# Patient Record
Sex: MALE | Race: BLACK OR AFRICAN AMERICAN | Employment: FULL TIME | URBAN - METROPOLITAN AREA
[De-identification: names, ages, dates, MRNs, and addresses within clinical notes are randomized per-mention and may not be internally consistent; named-entity substitution may affect disease eponyms.]

---

## 2017-04-09 ENCOUNTER — HOSPITAL ENCOUNTER (EMERGENCY)
Age: 22
Discharge: HOME | End: 2017-04-09
Attending: EMERGENCY MEDICINE | Admitting: EMERGENCY MEDICINE
Payer: SELF-PAY

## 2017-04-09 ENCOUNTER — APPOINTMENT (OUTPATIENT)
Dept: GENERAL RADIOLOGY | Age: 22
End: 2017-04-09
Attending: NURSE PRACTITIONER
Payer: SELF-PAY

## 2017-04-09 VITALS
OXYGEN SATURATION: 99 % | TEMPERATURE: 98.5 F | RESPIRATION RATE: 16 BRPM | HEIGHT: 68 IN | WEIGHT: 220 LBS | SYSTOLIC BLOOD PRESSURE: 160 MMHG | HEART RATE: 96 BPM | DIASTOLIC BLOOD PRESSURE: 108 MMHG | BODY MASS INDEX: 33.34 KG/M2
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
## 2017-04-09 DIAGNOSIS — S60.811A: ICD-10-CM

## 2017-04-09 DIAGNOSIS — S43.101A AC SEPARATION, RIGHT, INITIAL ENCOUNTER: ICD-10-CM

## 2017-04-09 DIAGNOSIS — I10 HYPERTENSION WITH GOAL TO BE DETERMINED: ICD-10-CM

## 2017-04-09 PROCEDURE — 99283 EMERGENCY DEPT VISIT LOW MDM: CPT

## 2017-04-09 PROCEDURE — 90471 IMMUNIZATION ADMIN: CPT

## 2017-04-09 PROCEDURE — 90715 TDAP VACCINE 7 YRS/> IM: CPT | Performed by: NURSE PRACTITIONER

## 2017-04-09 PROCEDURE — 74011250636 HC RX REV CODE- 250/636: Performed by: NURSE PRACTITIONER

## 2017-04-09 PROCEDURE — 74011250637 HC RX REV CODE- 250/637: Performed by: NURSE PRACTITIONER

## 2017-04-09 PROCEDURE — L3670 SO ACRO/CLAV CAN WEB PRE OTS: HCPCS

## 2017-04-09 PROCEDURE — 73030 X-RAY EXAM OF SHOULDER: CPT

## 2017-04-09 RX ORDER — OXYCODONE AND ACETAMINOPHEN 10; 325 MG/1; MG/1
1 TABLET ORAL
Status: COMPLETED | OUTPATIENT
Start: 2017-04-09 | End: 2017-04-09

## 2017-04-09 RX ORDER — OXYCODONE AND ACETAMINOPHEN 5; 325 MG/1; MG/1
1 TABLET ORAL
Qty: 10 TAB | Refills: 0 | Status: SHIPPED | OUTPATIENT
Start: 2017-04-09 | End: 2017-04-17

## 2017-04-09 RX ADMIN — TETANUS TOXOID, REDUCED DIPHTHERIA TOXOID AND ACELLULAR PERTUSSIS VACCINE, ADSORBED 0.5 ML: 5; 2.5; 8; 8; 2.5 SUSPENSION INTRAMUSCULAR at 16:33

## 2017-04-09 RX ADMIN — OXYCODONE HYDROCHLORIDE AND ACETAMINOPHEN 1 TABLET: 10; 325 TABLET ORAL at 16:33
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
## 2017-04-09 NOTE — DISCHARGE INSTRUCTIONS
Scrapes (Abrasions): Care Instructions  Your Care Instructions  Scrapes (abrasions) are wounds where your skin has been rubbed or torn off. Most scrapes do not go deep into the skin, but some may remove several layers of skin. Scrapes usually don't bleed much, but they may ooze pinkish fluid. Scrapes on the head or face may appear worse than they are. They may bleed a lot because of the good blood supply to this area. Most scrapes heal well and may not need a bandage. They usually heal within 3 to 7 days. A large, deep scrape may take 1 to 2 weeks or longer to heal. A scab may form on some scrapes. Follow-up care is a key part of your treatment and safety. Be sure to make and go to all appointments, and call your doctor if you are having problems. It's also a good idea to know your test results and keep a list of the medicines you take. How can you care for yourself at home? · If your doctor told you how to care for your wound, follow your doctor's instructions. If you did not get instructions, follow this general advice:  ¨ Wash the scrape with clean water 2 times a day. Don't use hydrogen peroxide or alcohol, which can slow healing. ¨ You may cover the scrape with a thin layer of petroleum jelly, such as Vaseline, and a nonstick bandage. ¨ Apply more petroleum jelly and replace the bandage as needed. · Prop up the injured area on a pillow anytime you sit or lie down during the next 3 days. Try to keep it above the level of your heart. This will help reduce swelling. · Be safe with medicines. Take pain medicines exactly as directed. ¨ If the doctor gave you a prescription medicine for pain, take it as prescribed. ¨ If you are not taking a prescription pain medicine, ask your doctor if you can take an over-the-counter medicine. When should you call for help?   Call your doctor now or seek immediate medical care if:  · You have signs of infection, such as:  ¨ Increased pain, swelling, warmth, or redness around the scrape. ¨ Red streaks leading from the scrape. ¨ Pus draining from the scrape. ¨ A fever. · The scrape starts to bleed, and blood soaks through the bandage. Oozing small amounts of blood is normal.  Watch closely for changes in your health, and be sure to contact your doctor if the scrape is not getting better each day. Where can you learn more? Go to http://shima-espinoza.info/. Enter A374 in the search box to learn more about \"Scrapes (Abrasions): Care Instructions. \"  Current as of: May 27, 2016  Content Version: 11.2  © 5724-4025 [employer]. Care instructions adapted under license by [employer] (which disclaims liability or warranty for this information). If you have questions about a medical condition or this instruction, always ask your healthcare professional. [address] [address] any warranty or liability for your use of this information. High Blood Pressure: Care Instructions  Your Care Instructions  If your blood pressure is usually above 140/90, you have high blood pressure, or hypertension. That means the top number is 140 or higher or the bottom number is 90 or higher, or both. Despite what a lot of people think, high blood pressure usually doesn't cause headaches or make you feel dizzy or lightheaded. It usually has no symptoms. But it does increase your risk for heart attack, stroke, and kidney or eye damage. The higher your blood pressure, the more your risk increases. Your doctor will give you a goal for your blood pressure. Your goal will be based on your health and your age. An example of a goal is to keep your blood pressure below 140/90. Lifestyle changes, such as eating healthy and being active, are always important to help lower blood pressure. You might also take medicine to reach your blood pressure goal.  Follow-up care is a key part of your treatment and safety.  Be sure to make and go to all appointments, and call your doctor if you are having problems. It's also a good idea to know your test results and keep a list of the medicines you take. How can you care for yourself at home? Medical treatment  · If you stop taking your medicine, your blood pressure will go back up. You may take one or more types of medicine to lower your blood pressure. Be safe with medicines. Take your medicine exactly as prescribed. Call your doctor if you think you are having a problem with your medicine. · Talk to your doctor before you start taking aspirin every day. Aspirin can help certain people lower their risk of a heart attack or stroke. But taking aspirin isn't right for everyone, because it can cause serious bleeding. · See your doctor regularly. You may need to see the doctor more often at first or until your blood pressure comes down. · If you are taking blood pressure medicine, talk to your doctor before you take decongestants or anti-inflammatory medicine, such as ibuprofen. Some of these medicines can raise blood pressure. · Learn how to check your blood pressure at home. Lifestyle changes  · Stay at a healthy weight. This is especially important if you put on weight around the waist. Losing even 10 pounds can help you lower your blood pressure. · If your doctor recommends it, get more exercise. Walking is a good choice. Bit by bit, increase the amount you walk every day. Try for at least 30 minutes on most days of the week. You also may want to swim, bike, or do other activities. · Avoid or limit alcohol. Talk to your doctor about whether you can drink any alcohol. · Try to limit how much sodium you eat to less than 2,300 milligrams (mg) a day. Your doctor may ask you to try to eat less than 1,500 mg a day. · Eat plenty of fruits (such as bananas and oranges), vegetables, legumes, whole grains, and low-fat dairy products. · Lower the amount of saturated fat in your diet.  Saturated fat is found in animal products such as milk, cheese, and meat. Limiting these foods may help you lose weight and also lower your risk for heart disease. · Do not smoke. Smoking increases your risk for heart attack and stroke. If you need help quitting, talk to your doctor about stop-smoking programs and medicines. These can increase your chances of quitting for good. When should you call for help? Call 911 anytime you think you may need emergency care. This may mean having symptoms that suggest that your blood pressure is causing a serious heart or blood vessel problem. Your blood pressure may be over 180/110. For example, call 911 if:  · You have symptoms of a heart attack. These may include:  ¨ Chest pain or pressure, or a strange feeling in the chest.  ¨ Sweating. ¨ Shortness of breath. ¨ Nausea or vomiting. ¨ Pain, pressure, or a strange feeling in the back, neck, jaw, or upper belly or in one or both shoulders or arms. ¨ Lightheadedness or sudden weakness. ¨ A fast or irregular heartbeat. · You have symptoms of a stroke. These may include:  ¨ Sudden numbness, tingling, weakness, or loss of movement in your face, arm, or leg, especially on only one side of your body. ¨ Sudden vision changes. ¨ Sudden trouble speaking. ¨ Sudden confusion or trouble understanding simple statements. ¨ Sudden problems with walking or balance. ¨ A sudden, severe headache that is different from past headaches. · You have severe back or belly pain. Do not wait until your blood pressure comes down on its own. Get help right away. Call your doctor now or seek immediate care if:  · Your blood pressure is much higher than normal (such as 180/110 or higher), but you don't have symptoms. · You think high blood pressure is causing symptoms, such as:  ¨ Severe headache. ¨ Blurry vision. Watch closely for changes in your health, and be sure to contact your doctor if:  · Your blood pressure measures 140/90 or higher at least 2 times.  That means the top number is 140 or higher or the bottom number is 90 or higher, or both. · You think you may be having side effects from your blood pressure medicine. · Your blood pressure is usually normal, but it goes above normal at least 2 times. Where can you learn more? Go to http://shima-espinoza.info/. Enter O340 in the search box to learn more about \"High Blood Pressure: Care Instructions. \"  Current as of: August 8, 2016  Content Version: 11.2  © 6921-9839 [employer]. Care instructions adapted under license by [employer] (which disclaims liability or warranty for this information). If you have questions about a medical condition or this instruction, always ask your healthcare professional. [address] any warranty or liability for your use of this information. Motor Vehicle Accident: Care Instructions  Your Care Instructions  You were seen by a doctor after a motor vehicle accident. Because of the accident, you may be sore for several days. Over the next few days, you may hurt more than you did just after the accident. The doctor has checked you carefully, but problems can develop later. If you notice any problems or new symptoms, get medical treatment right away. Follow-up care is a key part of your treatment and safety. Be sure to make and go to all appointments, and call your doctor if you are having problems. It's also a good idea to know your test results and keep a list of the medicines you take. How can you care for yourself at home? · Keep track of any new symptoms or changes in your symptoms. · Take it easy for the next few days, or longer if you are not feeling well. Do not try to do too much. · Put ice or a cold pack on any sore areas for 10 to 20 minutes at a time to stop swelling. Put a thin cloth between the ice pack and your skin. Do this several times a day for the first 2 days. · Be safe with medicines.  Take pain medicines exactly as directed. ¨ If the doctor gave you a prescription medicine for pain, take it as prescribed. ¨ If you are not taking a prescription pain medicine, ask your doctor if you can take an over-the-counter medicine. · Do not drive after taking a prescription pain medicine. · Do not do anything that makes the pain worse. · Do not drink any alcohol for 24 hours or until your doctor tells you it is okay. When should you call for help? Call 911 if:  · You passed out (lost consciousness). Call your doctor now or seek immediate medical care if:  · You have new or worse belly pain. · You have new or worse trouble breathing. · You have new or worse head pain. · You have new pain, or your pain gets worse. · You have new symptoms, such as numbness or vomiting. Watch closely for changes in your health, and be sure to contact your doctor if:  · You are not getting better as expected. Where can you learn more? Go to http://shima-espinoza.info/. Enter N000 in the search box to learn more about \"Motor Vehicle Accident: Care Instructions. \"  Current as of: May 27, 2016  Content Version: 11.2  © 5243-5821 [employer]. Care instructions adapted under license by [employer] (which disclaims liability or warranty for this information). If you have questions about a medical condition or this instruction, always ask your healthcare professional. [place] [address] any warranty or liability for your use of this information. Shoulder Separation: Care Instructions  Your Care Instructions    A shoulder separation is a tearing of the ligaments that connect two bones of the shoulder--the collarbone (clavicle) and the end of the shoulder blade (acromion). The ligaments can be partially or completely torn. This is usually caused by a blow to the top of the shoulder or a fall onto an outstretched arm.   Shoulder injuries can be slow to heal, but with time and effort, your shoulder should get better. Physical therapy can help you regain strength, motion, and flexibility in your shoulder. Follow-up care is a key part of your treatment and safety. Be sure to make and go to all appointments, and call your doctor if you are having problems. It's also a good idea to know your test results and keep a list of the medicines you take. How can you care for yourself at home? · If your doctor put your arm in a sling, wear the sling as directed. Do not take it off before your doctor tells you to. · Take pain medicines exactly as directed. ¨ If the doctor gave you a prescription medicine for pain, take it as prescribed. ¨ If you are not taking a prescription pain medicine, ask your doctor if you can take an over-the-counter medicine. · Rest your shoulder as much as you can. · Put ice or a cold pack on your shoulder for 10 to 20 minutes at a time. Try to do this every 1 to 2 hours for the next 3 days (when you are awake) or until the swelling goes down. Put a thin cloth between the ice and your skin. · You may use warm packs after the first 3 days for 15 to 20 minutes at a time to ease pain. · If your doctor gave you exercises to do at home, do them exactly as instructed. · Do not do anything that makes pain worse. · Go to all follow-up appointments. You and your doctor will decide if you need further treatment, including surgery. You and your doctor will also decide when to begin physical therapy, if it is needed. When should you call for help? Call your doctor now or seek immediate medical care if:  · Your pain gets a lot worse. · You cannot move your arm. · You have new weakness, numbness, or tingling in your hand or arm. · Your arm or hand is cool or pale or changes color. · Your sling feels too tight, and you cannot loosen it.   Watch closely for changes in your health, and be sure to contact your doctor if:  · You have new or increased swelling in your arm.  · You have new pain that develops in another area of your arm. For example, you have pain in your hand or elbow. · You do not get better as expected. Where can you learn more? Go to http://shima-espinoza.info/. Enter S051 in the search box to learn more about \"Shoulder Separation: Care Instructions. \"  Current as of: May 23, 2016  Content Version: 11.2  © 4096-1573 [employer]. Care instructions adapted under license by [employer] (which disclaims liability or warranty for this information). If you have questions about a medical condition or this instruction, always ask your healthcare professional. [address] any warranty or liability for your use of this information. bookjam Activation    Thank you for requesting access to bookjam. Please follow the instructions below to securely access and download your online medical record. bookjam allows you to send messages to your doctor, view your test results, renew your prescriptions, schedule appointments, and more. How Do I Sign Up? 1. In your internet browser, go to [email or web address]  2. Click on the First Time User? Click Here link in the Sign In box. You will be redirect to the New Member Sign Up page. 3. Enter your bookjam Access Code exactly as it appears below. You will not need to use this code after youve completed the sign-up process. If you do not sign up before the expiration date, you must request a new code. bookjam Access Code: [email or web address]  Expires: 2017  4:28 PM (This is the date your bookjam access code will )    4. Enter the last four digits of your Social Security Number (xxxx) and Date of Birth (mm/dd/yyyy) as indicated and click Submit. You will be taken to the next sign-up page. 5. Create a bookjam ID. This will be your bookjam login ID and cannot be changed, so think of one that is secure and easy to remember. 6. Create a bookjam password. You can change your password at any time. 7. Enter your Password Reset Question and Answer. This can be used at a later time if you forget your password. 8. Enter your e-mail address. You will receive e-mail notification when new information is available in [address] 9. Click Sign Up. You can now view and download portions of your medical record. 10. Click the Download Summary menu link to download a portable copy of your medical information. Additional Information    If you have questions, please visit the Frequently Asked Questions section of the 1[employer] website at [email or web address] Remember, 1SDK is NOT to be used for urgent needs. For medical emergencies, dial 911.

## 2017-04-11 ENCOUNTER — OFFICE VISIT (OUTPATIENT)
Dept: ORTHOPEDIC SURGERY | Age: 22
End: 2017-04-11

## 2017-04-11 VITALS
DIASTOLIC BLOOD PRESSURE: 99 MMHG | HEART RATE: 76 BPM | SYSTOLIC BLOOD PRESSURE: 147 MMHG | TEMPERATURE: 99.1 F | BODY MASS INDEX: 34.06 KG/M2 | WEIGHT: 224 LBS

## 2017-04-11 DIAGNOSIS — S43.101A ACROMIOCLAVICULAR JOINT SEPARATION, RIGHT, INITIAL ENCOUNTER: Primary | ICD-10-CM

## 2017-04-11 RX ORDER — OXYCODONE AND ACETAMINOPHEN 7.5; 325 MG/1; MG/1
1-2 TABLET ORAL
Qty: 60 TAB | Refills: 0 | Status: SHIPPED | OUTPATIENT
Start: 2017-04-11 | End: 2017-07-11

## 2017-04-11 RX ORDER — OXYCODONE AND ACETAMINOPHEN 5; 325 MG/1; MG/1
1 TABLET ORAL
Qty: 30 TAB | Refills: 0 | Status: SHIPPED | OUTPATIENT
Start: 2017-04-11 | End: 2017-07-11
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
## 2017-04-11 NOTE — LETTER
Patient: Cristofer Gage Surgery Date: 4/19/17  Time: 9:30AM 
 
PROCEDURE: RIGHT AC JOINT RECONSRUCTION Report for your Surgery at 8:00AM  @ [address] [address] [place]: 
  
o Five (5) days prior to surgery STOP taking ASPIRIN, ANTI-INFLAMMATORY , and/or BLOOD THINNER MEDICATIONS (such as COUMADIN, PLAVIX, HEPARIN or others). o If you are taking blood thinner medication please contact your prescribing physician for any special instructions. LAB: Report to  [facility]  or [facility]  14 days before your surgery date. (Your orders for these tests are in [address] [place] [address] at the [address] may not eat or drink anything after midnight the night before  
o It doesn't matter if you have eaten before going to the [facility] [address] 
o If required labs and EKG are not completed Surgery will be canceled. THE DAY OF SURGERY: 
  
 
o Do not eat, chew gum or drink anything after Midnight prior to the date of your surgery 
o Take your regular medications with small sips of water unless otherwise instructed. (This means blood pressure and/or heart medicine) If you are insulin dependent, bring your insulin with you, unless otherwise instructed. 
o Bring a list of your medications and the dosage to the hospital. 
o Do not wear nail polish, make-up or jewelry. o Do not bring valuables or money to the hospital. 
o Have someone accompany you so they may drive you home following the surgery. Bring your drivers license, your insurance card and Hospital co-pay. Return for your postoperative visit on Tuesday, April 25, 2017 01:20 PM at the [facility] SURG[facility] [id number] [place], [address] This appointment will be with  Dr. Cathi Hill. Nancy Valerio  [phone]      Surgical Coordinator
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
## 2017-04-11 NOTE — LETTER
[address] [facility] Surgery [facility] Fax to [phone]                                        Telephone: [phone] or [phone] To be completed by Physician Office: 
 
Date: 2017    Requested by: Leslie Smith Phone No: [phone]  Fax No:  [phone] Surgery Date: 17 Requested Time: 9:30AM         
      
Surgeon: Trudy Cardoza MD  Assistant/2nd Surgeon: Please [place]:            EMERGENT       URGENT         ELECTIVE  
CPT CODE*  Procedure 49585 RIGHT SHOULDER AC JOINT RECONSTRUCTION  
   
 *** *CPT code is required for ALL procedures. Patient Information: 
 
Name: Uri Hendrix SSN: [id number]  : 1995   Male/Female: male Home Phone No: [phone] (home) Primary Insurance: SELF PAY   Insurance Policy Number:   
 
*If self-pay, please fax Self-Pay/Anna Marie Verification Form with this request. Elective cases will not be scheduled until approved. Latex Allergy:  No  
Allergies: No Known Allergies ICD10 code(s): S43.101A  Diagnosis: AC JOINT SEPARATION Admission:  Outpatient Anesthesia Type General 
 
Comments/Special Equipment and/or  ARTHREX, DOG BONE, NO GRAFT

## 2017-04-12 ENCOUNTER — DOCUMENTATION ONLY (OUTPATIENT)
Dept: ORTHOPEDIC SURGERY | Age: 22
End: 2017-04-12

## 2017-04-18 ENCOUNTER — TELEPHONE (OUTPATIENT)
Dept: ORTHOPEDIC SURGERY | Age: 22
End: 2017-04-18

## 2017-04-18 ENCOUNTER — ANESTHESIA EVENT (OUTPATIENT)
Dept: SURGERY | Age: 22
End: 2017-04-18
Payer: SELF-PAY

## 2017-04-18 NOTE — DISCHARGE INSTRUCTIONS
Dr. Nuzhat Haro Shoulder Surgery  Postoperative Information    You will be given a prescription for pain medication. It may be taken every 4-6 hours as needed for the first few days after surgery. You should place an ice bag over your shoulder to help with pain and reduce swelling. A soft bandage was placed on your shoulder. You may take the bandage off two days after surgery and clean the incision sites with peroxide. Band-aids should be placed over each incision site for at least four days. There are 2 or 3 small incisions in your shoulder and they may be sore and develop bruising over the next several days. The bruising should resolve and no special care will be needed. It is safe to take a shower or bathe two days after surgery. Just keep your incision covered. You will be given a sling to use. Continue to wear this at all times, unless you are given different instructions. No moving the arm away from your body on your own, reaching or carrying with the operated arm. You will be shown specific exercises when you see your physical therapist in a week. Even though your incisions are small, there has been an operation inside and around the shoulder joint. Complete healing may take weeks or several months. If you have a high temperature, unexpected pain, redness or swelling in your shoulder please contact my office immediately. Please call to make an appointment to see me in my office in 1 week.      Dr. Nuzhat Haro office number [place] from Nurse    The following personal items are in your possession at time of discharge:    Dental Appliances: None  Visual Aid: None     Home Medications: None  Jewelry: None  Clothing: Pants, Shirt, Footwear, Socks, Undergarments  Other Valuables: None     PATIENT INSTRUCTIONS:    After general anesthesia or intravenous sedation, for 24 hours or while taking prescription Narcotics:  · Limit your activities  · Do not drive and operate hazardous machinery  · Do not make important personal or business decisions  · Do  not drink alcoholic beverages  · If you have not urinated within 8 hours after discharge, please contact your surgeon on call. Report the following to your surgeon:  · Excessive pain, swelling, redness or odor of or around the surgical area  · Temperature over 100.5  · Nausea and vomiting lasting longer than 4 hours or if unable to take medications  · Any signs of decreased circulation or nerve impairment to extremity: change in color, persistent  numbness, tingling, coldness or increase pain  · Any questions    *  Please give a list of your current medications to your Primary Care Provider. *  Please update this list whenever your medications are discontinued, doses are      changed, or new medications (including over-the-counter products) are added. *  Please carry medication information at all times in case of emergency situations. These are general instructions for a healthy lifestyle:    No smoking/ No tobacco products/ Avoid exposure to second hand smoke    Surgeon General's Warning:  Quitting smoking now greatly reduces serious risk to your health. Obesity, smoking, and sedentary lifestyle greatly increases your risk for illness    A healthy diet, regular physical exercise & weight monitoring are important for maintaining a healthy lifestyle    You may be retaining fluid if you have a history of heart failure or if you experience any of the following symptoms:  Weight gain of 3 pounds or more overnight or 5 pounds in a week, increased swelling in our hands or feet or shortness of breath while lying flat in bed. Please call your doctor as soon as you notice any of these symptoms; do not wait until your next office visit.     Recognize signs and symptoms of STROKE:    F-face looks uneven    A-arms unable to move or move unevenly    S-speech slurred or non-existent    T-time-call 911 as soon as signs and symptoms begin-DO NOT go       Back to bed or wait to see if you get better-TIME IS BRAIN. Warning Signs of HEART ATTACK     Call 911 if you have these symptoms:   Chest discomfort. Most heart attacks involve discomfort in the center of the chest that lasts more than a few minutes, or that goes away and comes back. It can feel like uncomfortable pressure, squeezing, fullness, or pain.  Discomfort in other areas of the upper body. Symptoms can include pain or discomfort in one or both arms, the back, neck, jaw, or stomach.  Shortness of breath with or without chest discomfort.  Other signs may include breaking out in a cold sweat, nausea, or lightheadedness. Don't wait more than five minutes to call 911 - MINUTES MATTER! Fast action can save your life. Calling 911 is almost always the fastest way to get lifesaving treatment. Emergency Medical Services staff can begin treatment when they arrive -- up to an hour sooner than if someone gets to the hospital by car. The discharge information has been reviewed with the patient and mother. The patient and mother verbalized understanding. Discharge medications reviewed with the patient and mother and appropriate educational materials and side effects teaching were provided.

## 2017-04-18 NOTE — TELEPHONE ENCOUNTER
PT CALLING IN WOULD LIKE TO GET A STATUS ON THE DISABILITY PAPERWORK THAT HE DROPPED OFF ON 4/11/17, PLEASE CALL THE PT AND ADVISE, THANK YOU.

## 2017-04-19 ENCOUNTER — HOSPITAL ENCOUNTER (OUTPATIENT)
Age: 22
Setting detail: OUTPATIENT SURGERY
Discharge: HOME | End: 2017-04-19
Attending: ORTHOPAEDIC SURGERY | Admitting: ORTHOPAEDIC SURGERY
Payer: SELF-PAY

## 2017-04-19 ENCOUNTER — ANESTHESIA (OUTPATIENT)
Dept: SURGERY | Age: 22
End: 2017-04-19
Payer: SELF-PAY

## 2017-04-19 ENCOUNTER — APPOINTMENT (OUTPATIENT)
Dept: GENERAL RADIOLOGY | Age: 22
End: 2017-04-19
Attending: ORTHOPAEDIC SURGERY
Payer: SELF-PAY

## 2017-04-19 VITALS
HEART RATE: 102 BPM | SYSTOLIC BLOOD PRESSURE: 156 MMHG | WEIGHT: 221 LBS | RESPIRATION RATE: 18 BRPM | BODY MASS INDEX: 33.49 KG/M2 | OXYGEN SATURATION: 100 % | DIASTOLIC BLOOD PRESSURE: 89 MMHG | HEIGHT: 68 IN | TEMPERATURE: 96 F

## 2017-04-19 PROCEDURE — 77030016625 HC BOWL MX BN4 ZIMM -A: Performed by: ORTHOPAEDIC SURGERY

## 2017-04-19 PROCEDURE — 77030031139 HC SUT VCRL2 J&J -A: Performed by: ORTHOPAEDIC SURGERY

## 2017-04-19 PROCEDURE — 77030008574 HC TBNG SUC IRR STRY -B: Performed by: ORTHOPAEDIC SURGERY

## 2017-04-19 PROCEDURE — 77030012012 HC AIRWY OP SFT TELE -A: Performed by: NURSE ANESTHETIST, CERTIFIED REGISTERED

## 2017-04-19 PROCEDURE — 76060000036 HC ANESTHESIA 2.5 TO 3 HR: Performed by: ORTHOPAEDIC SURGERY

## 2017-04-19 PROCEDURE — 77030004413 HC BUR OVL STRY -B: Performed by: ORTHOPAEDIC SURGERY

## 2017-04-19 PROCEDURE — 74011000250 HC RX REV CODE- 250

## 2017-04-19 PROCEDURE — 77030019908 HC STETH ESOPH SIMS -A: Performed by: NURSE ANESTHETIST, CERTIFIED REGISTERED

## 2017-04-19 PROCEDURE — 77030010816: Performed by: ORTHOPAEDIC SURGERY

## 2017-04-19 PROCEDURE — 77030008189 HC RTVR SUT ARTH CNMD -B: Performed by: ORTHOPAEDIC SURGERY

## 2017-04-19 PROCEDURE — L3650 SO 8 ABD RESTRAINT PRE OTS: HCPCS | Performed by: ORTHOPAEDIC SURGERY

## 2017-04-19 PROCEDURE — 74011250636 HC RX REV CODE- 250/636: Performed by: NURSE ANESTHETIST, CERTIFIED REGISTERED

## 2017-04-19 PROCEDURE — 77030006835 HC BLD SAW SAG STRY -B: Performed by: ORTHOPAEDIC SURGERY

## 2017-04-19 PROCEDURE — 77030008683 HC TU ET CUF COVD -A: Performed by: NURSE ANESTHETIST, CERTIFIED REGISTERED

## 2017-04-19 PROCEDURE — 76010000172 HC OR TIME 2.5 TO 3 HR INTENSV-TIER 1: Performed by: ORTHOPAEDIC SURGERY

## 2017-04-19 PROCEDURE — 77030002912 HC SUT ETHBND J&J -A: Performed by: ORTHOPAEDIC SURGERY

## 2017-04-19 PROCEDURE — 77030002933 HC SUT MCRYL J&J -A: Performed by: ORTHOPAEDIC SURGERY

## 2017-04-19 PROCEDURE — 76210000021 HC REC RM PH II 0.5 TO 1 HR: Performed by: ORTHOPAEDIC SURGERY

## 2017-04-19 PROCEDURE — 77030006668 HC BLD SHV MENSCS STRY -B: Performed by: ORTHOPAEDIC SURGERY

## 2017-04-19 PROCEDURE — 77030003666 HC NDL SPINAL BD -A: Performed by: ORTHOPAEDIC SURGERY

## 2017-04-19 PROCEDURE — 77030020788: Performed by: ORTHOPAEDIC SURGERY

## 2017-04-19 PROCEDURE — 77030011266 HC ELECTRD BLD INSL COVD -A: Performed by: ORTHOPAEDIC SURGERY

## 2017-04-19 PROCEDURE — 76210000016 HC OR PH I REC 1 TO 1.5 HR: Performed by: ORTHOPAEDIC SURGERY

## 2017-04-19 PROCEDURE — 77030011265 HC ELECTRD BLD HEX COVD -A: Performed by: ORTHOPAEDIC SURGERY

## 2017-04-19 PROCEDURE — 76942 ECHO GUIDE FOR BIOPSY: CPT | Performed by: ANESTHESIOLOGY

## 2017-04-19 PROCEDURE — 74011250636 HC RX REV CODE- 250/636

## 2017-04-19 PROCEDURE — 77030032490 HC SLV COMPR SCD KNE COVD -B: Performed by: ORTHOPAEDIC SURGERY

## 2017-04-19 PROCEDURE — 77030018074 HC RTVR SUT ARTH4 S&N -B: Performed by: ORTHOPAEDIC SURGERY

## 2017-04-19 PROCEDURE — 77030010785: Performed by: ORTHOPAEDIC SURGERY

## 2017-04-19 PROCEDURE — 77030018552 HC SUT FBRTAPE1 ARTH -B: Performed by: ORTHOPAEDIC SURGERY

## 2017-04-19 PROCEDURE — 77030002931: Performed by: ORTHOPAEDIC SURGERY

## 2017-04-19 PROCEDURE — 64418 NJX AA&/STRD SPRSCAP NRV: CPT | Performed by: ANESTHESIOLOGY

## 2017-04-19 PROCEDURE — 77030008191: Performed by: ORTHOPAEDIC SURGERY

## 2017-04-19 PROCEDURE — C1713 ANCHOR/SCREW BN/BN,TIS/BN: HCPCS | Performed by: ORTHOPAEDIC SURGERY

## 2017-04-19 PROCEDURE — 77030002922 HC SUT FBRWRE ARTH -B: Performed by: ORTHOPAEDIC SURGERY

## 2017-04-19 PROCEDURE — 77030013708 HC HNDPC SUC IRR PULS STRY –B: Performed by: ORTHOPAEDIC SURGERY

## 2017-04-19 PROCEDURE — 77030033537: Performed by: ORTHOPAEDIC SURGERY

## 2017-04-19 PROCEDURE — 77030018836 HC SOL IRR NACL ICUM -A: Performed by: ORTHOPAEDIC SURGERY

## 2017-04-19 PROCEDURE — 74011250637 HC RX REV CODE- 250/637: Performed by: NURSE ANESTHETIST, CERTIFIED REGISTERED

## 2017-04-19 PROCEDURE — 74011250636 HC RX REV CODE- 250/636: Performed by: ANESTHESIOLOGY

## 2017-04-19 PROCEDURE — 77030002916 HC SUT ETHLN J&J -A: Performed by: ORTHOPAEDIC SURGERY

## 2017-04-19 PROCEDURE — 77030004453 HC BUR SHV STRY -B: Performed by: ORTHOPAEDIC SURGERY

## 2017-04-19 PROCEDURE — 77030012407 HC DRN WND BARD -B: Performed by: ORTHOPAEDIC SURGERY

## 2017-04-19 PROCEDURE — 77030017964 HC PRB COAG4 STRY -C: Performed by: ORTHOPAEDIC SURGERY

## 2017-04-19 PROCEDURE — 77030018390 HC SPNG HEMSTAT2 J&J -B: Performed by: ORTHOPAEDIC SURGERY

## 2017-04-19 PROCEDURE — 77030008467 HC STPLR SKN COVD -B: Performed by: ORTHOPAEDIC SURGERY

## 2017-04-19 PROCEDURE — 77030020782 HC GWN BAIR PAWS FLX 3M -B: Performed by: ORTHOPAEDIC SURGERY

## 2017-04-19 PROCEDURE — 77030003601 HC NDL NRV BLK BBMI -A: Performed by: ORTHOPAEDIC SURGERY

## 2017-04-19 DEVICE — BUTTON SUT SHLDR TI DOG BNE: Type: IMPLANTABLE DEVICE | Site: SHOULDER | Status: FUNCTIONAL

## 2017-04-19 RX ORDER — SUCCINYLCHOLINE CHLORIDE 20 MG/ML
INJECTION INTRAMUSCULAR; INTRAVENOUS AS NEEDED
Status: DISCONTINUED | OUTPATIENT
Start: 2017-04-19 | End: 2017-04-19 | Stop reason: HOSPADM

## 2017-04-19 RX ORDER — FENTANYL CITRATE 50 UG/ML
100 INJECTION, SOLUTION INTRAMUSCULAR; INTRAVENOUS
Status: DISCONTINUED | OUTPATIENT
Start: 2017-04-19 | End: 2017-04-19 | Stop reason: HOSPADM

## 2017-04-19 RX ORDER — FAMOTIDINE 20 MG/1
20 TABLET, FILM COATED ORAL ONCE
Status: COMPLETED | OUTPATIENT
Start: 2017-04-19 | End: 2017-04-19

## 2017-04-19 RX ORDER — KETOROLAC TROMETHAMINE 30 MG/ML
INJECTION, SOLUTION INTRAMUSCULAR; INTRAVENOUS AS NEEDED
Status: DISCONTINUED | OUTPATIENT
Start: 2017-04-19 | End: 2017-04-19 | Stop reason: HOSPADM

## 2017-04-19 RX ORDER — PROPOFOL 10 MG/ML
INJECTION, EMULSION INTRAVENOUS AS NEEDED
Status: DISCONTINUED | OUTPATIENT
Start: 2017-04-19 | End: 2017-04-19 | Stop reason: HOSPADM

## 2017-04-19 RX ORDER — SODIUM CHLORIDE, SODIUM LACTATE, POTASSIUM CHLORIDE, CALCIUM CHLORIDE 600; 310; 30; 20 MG/100ML; MG/100ML; MG/100ML; MG/100ML
75 INJECTION, SOLUTION INTRAVENOUS CONTINUOUS
Status: DISCONTINUED | OUTPATIENT
Start: 2017-04-19 | End: 2017-04-19 | Stop reason: HOSPADM

## 2017-04-19 RX ORDER — ROPIVACAINE HYDROCHLORIDE 5 MG/ML
30 INJECTION, SOLUTION EPIDURAL; INFILTRATION; PERINEURAL
Status: COMPLETED | OUTPATIENT
Start: 2017-04-19 | End: 2017-04-19

## 2017-04-19 RX ORDER — ONDANSETRON 2 MG/ML
INJECTION INTRAMUSCULAR; INTRAVENOUS AS NEEDED
Status: DISCONTINUED | OUTPATIENT
Start: 2017-04-19 | End: 2017-04-19 | Stop reason: HOSPADM

## 2017-04-19 RX ORDER — CEFAZOLIN SODIUM 2 G/50ML
2 SOLUTION INTRAVENOUS ONCE
Status: DISCONTINUED | OUTPATIENT
Start: 2017-04-19 | End: 2017-04-19 | Stop reason: HOSPADM

## 2017-04-19 RX ORDER — LIDOCAINE HYDROCHLORIDE 20 MG/ML
INJECTION, SOLUTION EPIDURAL; INFILTRATION; INTRACAUDAL; PERINEURAL AS NEEDED
Status: DISCONTINUED | OUTPATIENT
Start: 2017-04-19 | End: 2017-04-19 | Stop reason: HOSPADM

## 2017-04-19 RX ORDER — SODIUM CHLORIDE 0.9 % (FLUSH) 0.9 %
5-10 SYRINGE (ML) INJECTION AS NEEDED
Status: DISCONTINUED | OUTPATIENT
Start: 2017-04-19 | End: 2017-04-19 | Stop reason: HOSPADM

## 2017-04-19 RX ORDER — FENTANYL CITRATE 50 UG/ML
INJECTION, SOLUTION INTRAMUSCULAR; INTRAVENOUS AS NEEDED
Status: DISCONTINUED | OUTPATIENT
Start: 2017-04-19 | End: 2017-04-19 | Stop reason: HOSPADM

## 2017-04-19 RX ORDER — MIDAZOLAM HYDROCHLORIDE 1 MG/ML
2 INJECTION, SOLUTION INTRAMUSCULAR; INTRAVENOUS ONCE
Status: COMPLETED | OUTPATIENT
Start: 2017-04-19 | End: 2017-04-19

## 2017-04-19 RX ORDER — CEFAZOLIN SODIUM IN 0.9 % NACL 2 G/100 ML
PLASTIC BAG, INJECTION (ML) INTRAVENOUS AS NEEDED
Status: DISCONTINUED | OUTPATIENT
Start: 2017-04-19 | End: 2017-04-19 | Stop reason: HOSPADM

## 2017-04-19 RX ORDER — DEXAMETHASONE SODIUM PHOSPHATE 4 MG/ML
INJECTION, SOLUTION INTRA-ARTICULAR; INTRALESIONAL; INTRAMUSCULAR; INTRAVENOUS; SOFT TISSUE AS NEEDED
Status: DISCONTINUED | OUTPATIENT
Start: 2017-04-19 | End: 2017-04-19 | Stop reason: HOSPADM

## 2017-04-19 RX ORDER — SODIUM CHLORIDE 0.9 % (FLUSH) 0.9 %
5-10 SYRINGE (ML) INJECTION EVERY 8 HOURS
Status: DISCONTINUED | OUTPATIENT
Start: 2017-04-19 | End: 2017-04-19 | Stop reason: HOSPADM

## 2017-04-19 RX ADMIN — FAMOTIDINE 20 MG: 20 TABLET ORAL at 09:28

## 2017-04-19 RX ADMIN — PROPOFOL 100 MG: 10 INJECTION, EMULSION INTRAVENOUS at 11:08

## 2017-04-19 RX ADMIN — FENTANYL CITRATE 100 MCG: 50 INJECTION, SOLUTION INTRAMUSCULAR; INTRAVENOUS at 11:05

## 2017-04-19 RX ADMIN — SUCCINYLCHOLINE CHLORIDE 100 MG: 20 INJECTION INTRAMUSCULAR; INTRAVENOUS at 11:05

## 2017-04-19 RX ADMIN — SUCCINYLCHOLINE CHLORIDE 100 MG: 20 INJECTION INTRAMUSCULAR; INTRAVENOUS at 11:08

## 2017-04-19 RX ADMIN — ONDANSETRON 4 MG: 2 INJECTION INTRAMUSCULAR; INTRAVENOUS at 12:23

## 2017-04-19 RX ADMIN — PROPOFOL 200 MG: 10 INJECTION, EMULSION INTRAVENOUS at 11:05

## 2017-04-19 RX ADMIN — SODIUM CHLORIDE, SODIUM LACTATE, POTASSIUM CHLORIDE, AND CALCIUM CHLORIDE 75 ML/HR: 600; 310; 30; 20 INJECTION, SOLUTION INTRAVENOUS at 09:28

## 2017-04-19 RX ADMIN — Medication 2 G: at 11:11

## 2017-04-19 RX ADMIN — DEXAMETHASONE SODIUM PHOSPHATE 4 MG: 4 INJECTION, SOLUTION INTRA-ARTICULAR; INTRALESIONAL; INTRAMUSCULAR; INTRAVENOUS; SOFT TISSUE at 13:28

## 2017-04-19 RX ADMIN — MIDAZOLAM HYDROCHLORIDE 2 MG: 1 INJECTION, SOLUTION INTRAMUSCULAR; INTRAVENOUS at 09:56

## 2017-04-19 RX ADMIN — ROPIVACAINE HYDROCHLORIDE 30 MG: 5 INJECTION, SOLUTION EPIDURAL; INFILTRATION; PERINEURAL at 10:01

## 2017-04-19 RX ADMIN — FENTANYL CITRATE 150 MCG: 50 INJECTION, SOLUTION INTRAMUSCULAR; INTRAVENOUS at 12:51

## 2017-04-19 RX ADMIN — KETOROLAC TROMETHAMINE 30 MG: 30 INJECTION, SOLUTION INTRAMUSCULAR; INTRAVENOUS at 13:28

## 2017-04-19 RX ADMIN — LIDOCAINE HYDROCHLORIDE 100 MG: 20 INJECTION, SOLUTION EPIDURAL; INFILTRATION; INTRACAUDAL; PERINEURAL at 11:05

## 2017-04-19 NOTE — BRIEF OP NOTE
BRIEF OPERATIVE NOTE    Date of Procedure: 4/19/2017   Preoperative Diagnosis: AC joint dislocation, right, initial encounter [S43.101A]  Postoperative Diagnosis: AC joint dislocation, right, initial encounter [C05.470S]    Procedure(s):  RIGHT SHOULDER AC JOINT RECONSTRUCTION/ARTHREX/DOG BONE, diagnostic arthroscopy  Surgeon(s) and Role:     * Denilson Mary MD - Primary     * Juan Rothman MD            Surgical Staff:  Circ-1: Trupti Badillo RN  Circ-Relief: Rm Santiago RN  Scrub Tech-1: Concord Mater Akhil  Scrub Tech-Relief: Judah May  Surg Asst-1: Apryl Wilson  Event Time In   Incision Start 1129   Incision Close 1345     Anesthesia: General   Estimated Blood Loss: minimla  Specimens: * No specimens in log *   Findings: as above   Complications: none  Implants:   Implant Name Type Inv.  Item Serial No.  Lot No. LRB No. Used Action   BUTTON DOG BONE --  - [id number]   BUTTON DOG BONE --    ARTHREX [id number] 1 Implanted

## 2017-04-19 NOTE — IP AVS SNAPSHOT
Summary of Care Report The Summary of Care report has been created to help improve care coordination. Users with access to [facility] or [facility] (Web-based application) may access additional patient information including the Discharge Summary. If you are not currently a [address] user and need more information, please call the number listed below in the [place] [address] section and ask to be connected with Medical Records. Facility Information Name Address Phone [address] [address] [phone] Patient Information Patient Name Sex APOLINAR Kam ([id number]) Male 1995 Discharge Information Admitting Provider Service Area Unit Chiqui Escamilla MD / [id number] / [phone] Discharge Provider Discharge Date/Time Discharge Disposition Destination (none) (none) (none) (none) Patient Language Language ENGLISH [13] Hospital Problems as of 2017  Reviewed: 2017  8:28 AM by Chiqui Escamilla MD  
 None Non-Hospital Problems as of 2017  Reviewed: 2017  8:28 AM by Chiqui Escamilla MD  
 None You are allergic to the following No active allergies Current Discharge Medication List  
  
ASK your doctor about these medications Dose & Instructions Dispensing Information Comments  
 ibuprofen 600 mg tablet Commonly known as:  MOTRIN Dose:  600 mg Take 1 Tab by mouth every six (6) hours as needed for Pain. Quantity:  20 Tab Refills:  0  
   
 * oxyCODONE-acetaminophen 7.5-325 mg per tablet Commonly known as:  PERCOCET 7.5 Dose:  1-2 Tab Take 1-2 Tabs by mouth every four (4) hours as needed for Pain. Max Daily Amount: 12 Tabs. Do not take until after surgery Quantity:  60 Tab Refills:  0 * oxyCODONE-acetaminophen 5-325 mg per tablet Commonly known as:  PERCOCET Dose:  1 Tab Take 1 Tab by mouth every four (4) hours as needed for Pain. Max Daily Amount: 6 Tabs. Quantity:  30 Tab Refills:  0  
   
 * Notice: This list has 2 medication(s) that are the same as other medications prescribed for you. Read the directions carefully, and ask your doctor or other care provider to review them with you. Current Immunizations Name Date Tdap 4/9/2017 Surgery Information ID Date/Time Status Primary Surgeon All Procedures Location [id number] 4/19/2017 0945 Unposted Kirstin Baca MD RIGHT SHOULDER [facility] JOINT RECONSTRUCTION/ARTHREX/DOG BONE [facility] MAIN OR Follow-up Information Follow up With Details Comments Contact Info Kirstin Baca MD Schedule an appointment as soon as possible for a visit in 1 week(s)  [address] [place] [address] [address] [place] [address] 
[phone] Discharge Instructions Dr. Janae Hastings Postoperative Information You will be given a prescription for pain medication. It may be taken every 4-6 hours as needed for the first few days after surgery. You should place an ice bag over your shoulder to help with pain and reduce swelling. A soft bandage was placed on your shoulder. You may take the bandage off two days after surgery and clean the incision sites with peroxide. Band-aids should be placed over each incision site for at least four days. There are 2 or 3 small incisions in your shoulder and they may be sore and develop bruising over the next several days. The bruising should resolve and no special care will be needed. It is safe to take a shower or bathe two days after surgery. Just keep your incision covered. You will be given a sling to use. Continue to wear this at all times, unless you are given different instructions.  No moving the arm away from your body on your own, reaching or carrying with the operated arm. You will be shown specific exercises when you see your physical therapist in a week. Even though your incisions are small, there has been an operation inside and around the shoulder joint. Complete healing may take weeks or several months. If you have a high temperature, unexpected pain, redness or swelling in your shoulder please contact my office immediately. Please call to make an appointment to see me in my office in 1 week. Dr. Jonny Johnson office number [phone] DISCHARGE SUMMARY from Nurse The following personal items are in your possession at time of discharge: 
 
Dental Appliances: None Visual Aid: None Home Medications: None Jewelry: None Clothing: Pants, Shirt, Footwear, Socks, Undergarments Other Valuables: None PATIENT INSTRUCTIONS: 
 
After general anesthesia or intravenous sedation, for 24 hours or while taking prescription Narcotics: · Limit your activities · Do not drive and operate hazardous machinery · Do not make important personal or business decisions · Do  not drink alcoholic beverages · If you have not urinated within 8 hours after discharge, please contact your surgeon on call. Report the following to your surgeon: 
· Excessive pain, swelling, redness or odor of or around the surgical area · Temperature over 100.5 · Nausea and vomiting lasting longer than 4 hours or if unable to take medications · Any signs of decreased circulation or nerve impairment to extremity: change in color, persistent  numbness, tingling, coldness or increase pain · Any questions *  Please give a list of your current medications to your Primary Care Provider. *  Please update this list whenever your medications are discontinued, doses are 
    changed, or new medications (including over-the-counter products) are added.  
 
*  Please carry medication information at all times in case of emergency situations. These are general instructions for a healthy lifestyle: No smoking/ No tobacco products/ Avoid exposure to second hand smoke Surgeon General's Warning:  Quitting smoking now greatly reduces serious risk to your health. Obesity, smoking, and sedentary lifestyle greatly increases your risk for illness A healthy diet, regular physical exercise & weight monitoring are important for maintaining a healthy lifestyle You may be retaining fluid if you have a history of heart failure or if you experience any of the following symptoms:  Weight gain of 3 pounds or more overnight or 5 pounds in a week, increased swelling in our hands or feet or shortness of breath while lying flat in bed. Please call your doctor as soon as you notice any of these symptoms; do not wait until your next office visit. Recognize signs and symptoms of STROKE: 
 
F-face looks uneven A-arms unable to move or move unevenly S-speech slurred or non-existent T-time-call 911 as soon as signs and symptoms begin-DO NOT go Back to bed or wait to see if you get better-TIME IS BRAIN. Warning Signs of HEART ATTACK Call 911 if you have these symptoms: 
? Chest discomfort. Most heart attacks involve discomfort in the center of the chest that lasts more than a few minutes, or that goes away and comes back. It can feel like uncomfortable pressure, squeezing, fullness, or pain. ? Discomfort in other areas of the upper body. Symptoms can include pain or discomfort in one or both arms, the back, neck, jaw, or stomach. ? Shortness of breath with or without chest discomfort. ? Other signs may include breaking out in a cold sweat, nausea, or lightheadedness. Don't wait more than five minutes to call [address]! Fast action can save your life. Calling 911 is almost always the fastest way to get lifesaving treatment.  Emergency Medical Services staff can begin treatment when they arrive  up to an hour sooner than if someone gets to the hospital by car. The discharge information has been reviewed with the patient and mother. The patient and mother verbalized understanding. Discharge medications reviewed with the patient and mother and appropriate educational materials and side effects teaching were provided. Chart Review Routing History No Routing History on File

## 2017-04-19 NOTE — PROGRESS NOTES
Date of Surgery Update:  Dian Rivera was seen and examined. History and physical has been reviewed. The patient has been examined.  There have been no significant clinical changes since the completion of the originally dated History and Physical.    Signed By: Vanessa Griffin MD     April 19, 2017 10:01 AM

## 2017-04-19 NOTE — PERIOP NOTES
I have reviewed discharge instructions with the patient and mother. The patient and mother verbalized understanding. Patient armband removed and shredded.

## 2017-04-19 NOTE — ANESTHESIA PROCEDURE NOTES
Peripheral Block    Start time: 4/19/2017 9:53 AM  End time: 4/19/2017 10:03 AM  Performed by: Velasquez Bliss  Authorized by: Velasquez Bliss       Pre-procedure: Indications: at surgeon's request, post-op pain management and procedure for pain    Preanesthetic Checklist: patient identified, risks and benefits discussed, site marked, timeout performed, anesthesia consent given and patient being monitored      Block Type:   Block Type:  Brachial plexus  Laterality:  Right  Monitoring:  Standard ASA monitoring, continuous pulse ox, frequent vital sign checks, oxygen, responsive to questions and heart rate  Injection Technique:  Single shot  Procedures: ultrasound guided    Prep: chlorhexidine    Needle Type:  Stimuplex  Needle Gauge:  22 G  Needle Localization:  Ultrasound guidance  Medication Injected:  0.5%  ropivacaine  Volume (mL):  25    Assessment:  Number of attempts:  1  Injection Assessment:  No intravascular symptoms, negative aspiration for blood, local visualized surrounding nerve on ultrasound, ultrasound image on chart, no paresthesia and incremental injection every 5 mL  Patient tolerance:  Patient tolerated the procedure well with no immediate complications  Location:  PREOP HOLDING    Patient given 2 mg IV Versed and 100 mcg IV Fentanyl for sedation.     4/19/2017     10:04 AM     Alona Fernandez MD

## 2017-04-19 NOTE — ANESTHESIA PREPROCEDURE EVALUATION
Anesthetic History   No history of anesthetic complications            Review of Systems / Medical History  Patient summary reviewed and pertinent labs reviewed    Pulmonary  Within defined limits                 Neuro/Psych   Within defined limits           Cardiovascular  Within defined limits              Pertinent negatives: No hypertension  Exercise tolerance: >4 METS     GI/Hepatic/Renal  Within defined limits              Endo/Other  Within defined limits           Other Findings   Comments:   Risk Factors for Postoperative nausea/vomiting:       History of postoperative nausea/vomiting? NO       Female? NO       Motion sickness? NO       Intended opioid administration for postoperative analgesia?   YES         Physical Exam    Airway  Mallampati: II  TM Distance: 4 - 6 cm  Neck ROM: normal range of motion   Mouth opening: Normal     Cardiovascular  Regular rate and rhythm,  S1 and S2 normal,  no murmur, click, rub, or gallop  Rhythm: regular  Rate: normal         Dental    Dentition: Lower dentition intact and Upper dentition intact     Pulmonary  Breath sounds clear to auscultation               Abdominal  GI exam deferred       Other Findings            Anesthetic Plan    ASA: 1  Anesthesia type: general and regional - interscalene block          Induction: Intravenous  Anesthetic plan and risks discussed with: Patient

## 2017-04-19 NOTE — ANESTHESIA POSTPROCEDURE EVALUATION
Post-Anesthesia Evaluation and Assessment    Patient: Roverto Jacobs MRN: [id number]  SSN: [id number]    YOB: 1995  Age: 23 y.o. Sex: male       Cardiovascular Function/Vital Signs  Visit Vitals    BP (!) 150/96    Pulse (!) 105    Temp 36.4 °C (97.5 °F)    Resp 19    Ht 5' 8\" (1.727 m)    Wt 100.2 kg (221 lb)    SpO2 100%    BMI 33.6 kg/m2       Patient is status post general, regional anesthesia for Procedure(s):  RIGHT SHOULDER AC JOINT RECONSTRUCTION/ARTHREX/DOG BONE. Nausea/Vomiting: None    Postoperative hydration reviewed and adequate. Pain:  Pain Scale 1: Visual (04/19/17 1358)  Pain Intensity 1: 0 (04/19/17 1358)   Managed    Neurological Status:   Neuro (WDL): Within Defined Limits (04/19/17 1358)   At baseline    Mental Status and Level of Consciousness: Arousable    Pulmonary Status:   O2 Device: Oxygen mask (04/19/17 1402)   Adequate oxygenation and airway patent    Complications related to anesthesia: None    Post-anesthesia assessment completed.  No concerns    Signed By: Deidra Mullins MD     April 19, 2017

## 2017-04-19 NOTE — IP AVS SNAPSHOT
Current Discharge Medication List  
  
ASK your doctor about these medications Dose & Instructions Dispensing Information Comments Morning Noon Evening Bedtime  
 ibuprofen 600 mg tablet Commonly known as:  MOTRIN Your last dose was: Your next dose is:    
   
   
 Dose:  600 mg Take 1 Tab by mouth every six (6) hours as needed for Pain. Quantity:  20 Tab Refills:  0  
     
   
   
   
  
 * oxyCODONE-acetaminophen 7.5-325 mg per tablet Commonly known as:  PERCOCET 7.5 Your last dose was: Your next dose is:    
   
   
 Dose:  1-2 Tab Take 1-2 Tabs by mouth every four (4) hours as needed for Pain. Max Daily Amount: 12 Tabs. Do not take until after surgery Quantity:  60 Tab Refills:  0  
     
   
   
   
  
 * oxyCODONE-acetaminophen 5-325 mg per tablet Commonly known as:  PERCOCET Your last dose was: Your next dose is:    
   
   
 Dose:  1 Tab Take 1 Tab by mouth every four (4) hours as needed for Pain. Max Daily Amount: 6 Tabs. Quantity:  30 Tab Refills:  0  
     
   
   
   
  
 * Notice: This list has 2 medication(s) that are the same as other medications prescribed for you. Read the directions carefully, and ask your doctor or other care provider to review them with you.

## 2017-04-19 NOTE — IP AVS SNAPSHOT
Alexus Pinedazita 
 
 
 [address] [facility] [address] 
[phone] Patient: Adiel Andres MRN: [id number] :1995 You are allergic to the following No active allergies Recent Documentation Height Weight BMI Smoking Status 1.727 m 100.2 kg 33.6 kg/m2 Never Smoker Emergency Contacts Name Discharge Info Relation Home Work Mobile Nitza Schaefer  Parent [1] [phone] About your hospitalization You were admitted on:  2017 You last received care in the:  [facility] PACU You were discharged on:  2017 Unit phone number:  [phone] Why you were hospitalized Your primary diagnosis was:  Not on File Providers Seen During Your Hospitalizations Provider Role Specialty Primary office phone Pepper Swift MD Attending Provider Orthopedic Surgery [phone] Your Primary Care Physician (PCP) Primary Care Physician Office Phone Office Fax NONE ** None ** ** None ** Follow-up Information Follow up With Details Comments Contact Info Pepper Swift MD Schedule an appointment as soon as possible for a visit in 1 week(s)  [place] [address] [address] [facility] 
[phone] Your Appointments 2017  1:20 PM EDT  
POST OP with Otilia Lombardi MD  
[address] [facility], [address] [facility] [place] [address], [address] [facility]  
[phone] 2017 12:00 PM EDT  
pt eval with Corky Solomon, PT  
[facility] PT [address] [facility] [facility]) [address] [place] [address] [address] [place] [address]  
[phone] Current Discharge Medication List  
  
ASK your doctor about these medications Dose & Instructions Dispensing Information Comments Morning Noon Evening Bedtime ibuprofen 600 mg tablet Commonly known as:  MOTRIN Your last dose was: Your next dose is:    
   
   
 Dose:  600 mg Take 1 Tab by mouth every six (6) hours as needed for Pain. Quantity:  20 Tab Refills:  0  
     
   
   
   
  
 * oxyCODONE-acetaminophen 7.5-325 mg per tablet Commonly known as:  PERCOCET 7.5 Your last dose was: Your next dose is:    
   
   
 Dose:  1-2 Tab Take 1-2 Tabs by mouth every four (4) hours as needed for Pain. Max Daily Amount: 12 Tabs. Do not take until after surgery Quantity:  60 Tab Refills:  0  
     
   
   
   
  
 * oxyCODONE-acetaminophen 5-325 mg per tablet Commonly known as:  PERCOCET Your last dose was: Your next dose is:    
   
   
 Dose:  1 Tab Take 1 Tab by mouth every four (4) hours as needed for Pain. Max Daily Amount: 6 Tabs. Quantity:  30 Tab Refills:  0  
     
   
   
   
  
 * Notice: This list has 2 medication(s) that are the same as other medications prescribed for you. Read the directions carefully, and ask your doctor or other care provider to review them with you. Discharge Instructions Dr. Zac Smith Postoperative Information You will be given a prescription for pain medication. It may be taken every 4-6 hours as needed for the first few days after surgery. You should place an ice bag over your shoulder to help with pain and reduce swelling. A soft bandage was placed on your shoulder. You may take the bandage off two days after surgery and clean the incision sites with peroxide. Band-aids should be placed over each incision site for at least four days. There are 2 or 3 small incisions in your shoulder and they may be sore and develop bruising over the next several days. The bruising should resolve and no special care will be needed. It is safe to take a shower or bathe two days after surgery. Just keep your incision covered. You will be given a sling to use. Continue to wear this at all times, unless you are given different instructions. No moving the arm away from your body on your own, reaching or carrying with the operated arm. You will be shown specific exercises when you see your physical therapist in a week. Even though your incisions are small, there has been an operation inside and around the shoulder joint. Complete healing may take weeks or several months. If you have a high temperature, unexpected pain, redness or swelling in your shoulder please contact my office immediately. Please call to make an appointment to see me in my office in 1 week. Dr. Ollie Meyer office number [phone] DISCHARGE SUMMARY from Nurse The following personal items are in your possession at time of discharge: 
 
Dental Appliances: None Visual Aid: None Home Medications: None Jewelry: None Clothing: Pants, Shirt, Footwear, Socks, Undergarments Other Valuables: None PATIENT INSTRUCTIONS: 
 
After general anesthesia or intravenous sedation, for 24 hours or while taking prescription Narcotics: · Limit your activities · Do not drive and operate hazardous machinery · Do not make important personal or business decisions · Do  not drink alcoholic beverages · If you have not urinated within 8 hours after discharge, please contact your surgeon on call. Report the following to your surgeon: 
· Excessive pain, swelling, redness or odor of or around the surgical area · Temperature over 100.5 · Nausea and vomiting lasting longer than 4 hours or if unable to take medications · Any signs of decreased circulation or nerve impairment to extremity: change in color, persistent  numbness, tingling, coldness or increase pain · Any questions *  Please give a list of your current medications to your Primary Care Provider.  
 
*  Please update this list whenever your medications are discontinued, doses are 
 changed, or new medications (including over-the-counter products) are added. *  Please carry medication information at all times in case of emergency situations. These are general instructions for a healthy lifestyle: No smoking/ No tobacco products/ Avoid exposure to second hand smoke Surgeon General's Warning:  Quitting smoking now greatly reduces serious risk to your health. Obesity, smoking, and sedentary lifestyle greatly increases your risk for illness A healthy diet, regular physical exercise & weight monitoring are important for maintaining a healthy lifestyle You may be retaining fluid if you have a history of heart failure or if you experience any of the following symptoms:  Weight gain of 3 pounds or more overnight or 5 pounds in a week, increased swelling in our hands or feet or shortness of breath while lying flat in bed. Please call your doctor as soon as you notice any of these symptoms; do not wait until your next office visit. Recognize signs and symptoms of STROKE: 
 
F-face looks uneven A-arms unable to move or move unevenly S-speech slurred or non-existent T-time-call 911 as soon as signs and symptoms begin-DO NOT go Back to bed or wait to see if you get better-TIME IS BRAIN. Warning Signs of HEART ATTACK Call 911 if you have these symptoms: 
? Chest discomfort. Most heart attacks involve discomfort in the center of the chest that lasts more than a few minutes, or that goes away and comes back. It can feel like uncomfortable pressure, squeezing, fullness, or pain. ? Discomfort in other areas of the upper body. Symptoms can include pain or discomfort in one or both arms, the back, neck, jaw, or stomach. ? Shortness of breath with or without chest discomfort. ? Other signs may include breaking out in a cold sweat, nausea, or lightheadedness. Don't wait more than five minutes to call [address]!  Fast action can save your life. Calling 911 is almost always the fastest way to get lifesaving treatment. Emergency Medical Services staff can begin treatment when they arrive  up to an hour sooner than if someone gets to the hospital by car. The discharge information has been reviewed with the patient and mother. The patient and mother verbalized understanding. Discharge medications reviewed with the patient and mother and appropriate educational materials and side effects teaching were provided. Discharge Orders None Introducing [facility]! Alyson Cardona introduces [employer] patient portal. Now you can access parts of your medical record, email your doctor's office, and request medication refills online. 1. In your internet browser, go to [email or web address] 2. Click on the First Time User? Click Here link in the Sign In box. You will see the New Member Sign Up page. 3. Enter your [employer] Access Code exactly as it appears below. You will not need to use this code after youve completed the sign-up process. If you do not sign up before the expiration date, you must request a new code. · [employer] Access Code: [email or web address] Expires: 7/8/2017  4:28 PM 
 
4. Enter the last four digits of your Social Security Number (xxxx) and Date of Birth (mm/dd/yyyy) as indicated and click Submit. You will be taken to the next sign-up page. 5. Create a [employer] ID. This will be your [employer] login ID and cannot be changed, so think of one that is secure and easy to remember. 6. Create a [employer] password. You can change your password at any time. 7. Enter your Password Reset Question and Answer. This can be used at a later time if you forget your password. 8. Enter your e-mail address. You will receive e-mail notification when new information is available in [address] 9. Click Sign Up. You can now view and download portions of your medical record. 10. Click the Download Summary menu link to download a portable copy of your medical information. If you have questions, please visit the Frequently Asked Questions section of the [employer] website. Remember, MyChart is NOT to be used for urgent needs. For medical emergencies, dial 911. Now available from your iPhone and Android! General Information Please provide this summary of care documentation to your next provider. Patient Signature:  ____________________________________________________________ Date:  ____________________________________________________________  
  
[facility] Provider Signature:  ____________________________________________________________ Date:  ____________________________________________________________

## 2017-04-20 NOTE — OP NOTES
[facility]  OPERATIVE REPORTS    Name:  Ammie Brunner  MR#:  [id number]  :  1995  Account #:  [de-identified]  Date of Adm:  2017  Date of Surgery:  2017      PREOPERATIVE DIAGNOSIS: Type 4 acromioclavicular joint  separation, right shoulder. POSTOPERATIVE DIAGNOSIS: Type 4 acromioclavicular joint  separation, right shoulder. PROCEDURES PERFORMED: Diagnostic arthroscopy, right shoulder. Open acromioclavicular joint reconstruction. SURGEON: Cornelia Crandall MD.    ESTIMATED BLOOD LOSS: 20 mL. SPECIMENS REMOVED: None. FINDINGS: As above. ANESTHESIA: General.    BRIEF HISTORY: The patient has had problems with his shoulder after  an injury sustaining a grade 4 AC joint separation. He was consented  for surgery after having discussed at length possible risks and  complications of surgery including infection, bleeding, recurrence of  pain, among other possible problems. DESCRIPTION OF DETAIL: The patient was taken to the operating  room, induced under general endotracheal by the anesthesia staff,  placed in a beach chair position. The right arm prepped with DuraPrep  solution and draped as a free sterile field. Posterior portal was used for  arthroscopy portal, the low anterior lateral portal as a work portal.  Portals were made with an 11 blade followed by blunt trocars. Once  the arthroscope was in place, the shoulder was inspected. The biceps  tendon and subscapularis were intact. Through the anterior portal the  rotator interval was developed and trying to get a good angle for  arthroscopic assisted reconstruction, but given his size deemed not  feasible at which time, a 4 cm incision over Elsy's lines 3 cm from  medial to the [facility] joint was made. Subcutaneous tissues dissected  bluntly to the level of the fascia. The clavicle periosteum was opened  horizontally and the base of the coracoid was exposed.  Using a right-  angle passer under the coracoid 2 FiberTape sutures were passed underneath the  coracoid base a 3.2 drill hole was used at the clavicle side 3 cm medial  to the [facility] joint and the sutures passed through this from inferior  to superior, 2 sutures were loaded onto a Dog Bone. The [facility] joint was  then reduced and the Dog Bone sutures  were tied. Periosteum was  tied on top of it with fiber wire, effecting a stable construct. It was  visualized under image intensifier as well. Subcutaneous tissues  closed with 3-0 Vicryl and the skin with 4-0 Monocryl. Sterile dressings  were applied. The patient tolerated the procedure well, was taken to  recovery  room without problems.         MD MELANIE Barry / Lucy Euceda  D:  04/20/2017   09:11  T:  04/20/2017   10:14  Job #:  [id number]

## 2017-04-25 ENCOUNTER — DOCUMENTATION ONLY (OUTPATIENT)
Dept: ORTHOPEDIC SURGERY | Age: 22
End: 2017-04-25

## 2017-04-25 ENCOUNTER — OFFICE VISIT (OUTPATIENT)
Dept: ORTHOPEDIC SURGERY | Age: 22
End: 2017-04-25

## 2017-04-25 VITALS
TEMPERATURE: 97.6 F | DIASTOLIC BLOOD PRESSURE: 86 MMHG | HEIGHT: 68 IN | HEART RATE: 75 BPM | BODY MASS INDEX: 33.01 KG/M2 | WEIGHT: 217.8 LBS | SYSTOLIC BLOOD PRESSURE: 139 MMHG

## 2017-04-25 DIAGNOSIS — S43.101A ACROMIOCLAVICULAR JOINT SEPARATION, RIGHT, INITIAL ENCOUNTER: Primary | ICD-10-CM

## 2017-04-25 NOTE — LETTER
NOTIFICATION OF RETURN TO WORK  
 
4/25/2017 2:24 PM 
 
Mr. Misael Urias [address] [place] [id number] [place] To Whom It May Concern: 
 
Kassy Mclaughlin is under the care of [address] [place] [address] Toy Guadarrama. He will remain out of work until his follow up with Dr. Sangeetha Sethi in 2 weeks. If there are questions or concerns please have the patient contact our office. Sincerely, Luis Armando Childress MD

## 2017-04-25 NOTE — PROGRESS NOTES
Patient: Nicole Cerna                MRN: [id number]       SSN: [id number]  YOB: 1995        AGE: 23 y.o. SEX: male  There is no height or weight on file to calculate BMI. PCP: None  04/25/17      No chief complaint on file. HISTORY OF PRESENT ILLNESS: Nicole Cerna is a 23 y.o. male who presents to the office for s/p 1 week left shoulder AC separation reconstructive surgery. He is faithfully wearing his sling and abduction pillow. Review of Systems   Constitutional: Negative. HENT: Negative. Eyes: Negative. Respiratory: Negative. Cardiovascular: Negative. Gastrointestinal: Negative. Genitourinary: Negative. Musculoskeletal: Positive for joint pain (right shoulder). Skin: Negative. Neurological: Negative. Endo/Heme/Allergies: Negative. Psychiatric/Behavioral: Negative. Social History     Social History    Marital status: SINGLE     Spouse name: N/A    Number of children: N/A    Years of education: N/A     Occupational History    Not on file. Social History Main Topics    Smoking status: Never Smoker    Smokeless tobacco: Never Used    Alcohol use Yes      Comment: occasionally    Drug use: No    Sexual activity: Yes     Partners: Female     Birth control/ protection: None     Other Topics Concern    Not on file     Social History Narrative        Past Medical History:   Diagnosis Date    Hypertension     no meds        No Known Allergies      Current Outpatient Prescriptions   Medication Sig    oxyCODONE-acetaminophen (PERCOCET 7.5) 7.5-325 mg per tablet Take 1-2 Tabs by mouth every four (4) hours as needed for Pain. Max Daily Amount: 12 Tabs. Do not take until after surgery    oxyCODONE-acetaminophen (PERCOCET) 5-325 mg per tablet Take 1 Tab by mouth every four (4) hours as needed for Pain. Max Daily Amount: 6 Tabs.  ibuprofen (MOTRIN) 600 mg tablet Take 1 Tab by mouth every six (6) hours as needed for Pain. No current facility-administered medications for this visit. Physical Exam  Constitutional: he is oriented to person, place, and time and well-developed, well-nourished, and in no distress. No distress. HENT:   Head: Normocephalic and atraumatic. Right Ear: Hearing normal.   Left Ear: Hearing normal.   Nose: Nose normal.   Eyes: Conjunctivae, EOM and lids are normal. Pupils are equal, round, and reactive to light. Neck: Trachea normal.   Pulmonary/Chest: Effort normal and breath sounds normal. No respiratory distress. Abdominal: Soft. Neurological: he is alert and oriented to person, place, and time. Skin: Skin is warm, dry and intact. he is not diaphoretic. Psychiatric: Affect normal.   Nursing note and vitals reviewed. Ortho Exam   This surgery site is clean dry and no sign of infection. RADIOGRAPHS:   No new XR's taken today. IMPRESSION & PLAN: Pt will continue with PT. I explained to him no strengthening of the shoulder girdle. Emphasis on ROM to avoid stiffness. He will be re-evaluated in 2 weeks. Written by Gerhardt Searing, as dictated by Dr. Lorelie Phoenix, Dr. Monica Bernard, confirm that all documentation is accurate.

## 2017-04-25 NOTE — PROGRESS NOTES
[employer] form completed but not faxed because there is no signature from patient at this time giving authorization.

## 2017-04-26 ENCOUNTER — HOSPITAL ENCOUNTER (OUTPATIENT)
Dept: PHYSICAL THERAPY | Age: 22
Discharge: HOME | End: 2017-04-26
Payer: SELF-PAY

## 2017-04-26 PROCEDURE — 97110 THERAPEUTIC EXERCISES: CPT

## 2017-04-26 PROCEDURE — 97161 PT EVAL LOW COMPLEX 20 MIN: CPT

## 2017-04-26 NOTE — PROGRESS NOTES
[facility] [address]  [address], [address] [phone] fax    Plan of Care/ Statement of Necessity for Physical Therapy Services           Patient name: Gerardo Gregory Start of Care: 2017   Referral source: Marino Villgaomez,* : 1995    Medical Diagnosis: Pain in right shoulder [M25.511]  Acromioclavicular joint separation, right, initial encounter [S43.101A]   Onset Date:17    Treatment Diagnosis: s/p R AC joint reconstruction   Prior Hospitalization: see medical history Provider#: [id number]   Medications: Verified on Patient summary List    Comorbidities: HTN   Prior Level of Function: working as  at [employer], in school at [email or web address], lives with girlfriend and 2 children, coaches youth football    The Plan of Care and following information is based on the information from the initial evaluation. Assessment/ key information: Patient is a 22 y.o.male presenting with Pain in right shoulder [M25.511]  Acromioclavicular joint separation, right, initial encounter [S43.101A]. Ms. Tiffanie Live was a pleasure to evaluate s/p R AC joint reconstruction (DOS: 17). He arrives to PT wearing sling appropriately, incisions dry & intact, following passive motion restrictions. He was guarded with gentle passive motion assessment, but cooperative and reports pain in low-level. We will work to progress gentle ROM per MD order, with progression as cleared. As the patient is self-pay, we will start with 2x/week. Should his passive motion not progress over the next week or so, we will increase frequency to 3X/week. Patient will benefit from skilled PT services to address deficits and facilitate return to premorbid activity level and promote improved quality of life.        Evaluation Complexity History LOW Complexity : Zero comorbidities / personal factors that will impact the outcome / POC; Examination LOW Complexity : 1-2 Standardized tests and measures addressing body structure, function, activity limitation and / or participation in recreation  ;Presentation MEDIUM Complexity : Evolving with changing characteristics  ; Clinical Decision Making HIGH Complexity : FOTO score of 1- 25   Overall Complexity Rating: LOW   Problem List: pain affecting function, decrease ROM, decrease strength, edema affecting function, impaired gait/ balance, decrease ADL/ functional abilitiies, decrease activity tolerance, decrease flexibility/ joint mobility and decrease transfer abilities   Treatment Plan may include any combination of the following: Therapeutic exercise, Therapeutic activities, Neuromuscular re-education, Physical agent/modality, Gait/balance training, Manual therapy, Aquatic therapy, Patient education, Self Care training, Functional mobility training, Home safety training and Stair training  Patient / Family readiness to learn indicated by: asking questions, trying to perform skills and interest  Persons(s) to be included in education: patient (P)  Barriers to Learning/Limitations: None  Patient Goal (s): get better movement in shoulder.   Patient Self Reported Health Status: good  Rehabilitation Potential: good  Short Term Goals: To be accomplished in 1 weeks:  1. Establish HEP for ROM & Strengthening. Long Term Goals: To be accomplished in 10 treatments:  1. Patient will be independent with HEP for ROM & Strengthening. Eval Status: na  2. Pt will increase R Shoulder passive elevation to 160 degrees to increase ease with ADLs. Eval Status:50 deg  3. Pt will increase R Shoulder active elevation to 120 degrees to increase ease with ADLs. Eval Status:n/a  4. Pt will increase FOTO score to 65 points to demonstrate improved functional lift & carry. Eval Status:FOTO: 18      Frequency / Duration: Patient to be seen 2 times per week for 4 weeks.     Patient/ Caregiver education and instruction: Diagnosis, prognosis, self care, activity modification and exercises   [x]  Plan of care has been reviewed with YAZMIN Stiles, PT 4/26/2017 12:52 PM    ________________________________________________________________________    I certify that the above Therapy Services are being furnished while the patient is under my care. I agree with the treatment plan and certify that this therapy is necessary.     Physician's Signature:____________________  Date:____________Time: _________    Please sign and return to [facility] [address]  [address] [place], [address] [phone] fax

## 2017-04-26 NOTE — PROGRESS NOTES
PT DAILY TREATMENT NOTE 8-14    Patient Name: Mikala Christine  Date:2017  : 1995   [x]  Patient  Verified  Payor: SELF PAY / Plan: [facility] SELF PAY / Product Type: Self Pay /    In time:1200  Out time:1235  Total Treatment Time (min): 35  Visit #: 1 of 8    Treatment Area: Pain in right shoulder [M25.511]  Acromioclavicular joint separation, right, initial encounter [F58.658P]    SUBJECTIVE  Pain Level (0-10 scale): 3  Any medication changes, allergies to medications, adverse drug reactions, diagnosis change, or new procedure performed?: [x] No    [] Yes (see summary sheet for update)  Subjective functional status/changes:   [x] See Eval form in paper chart     OBJECTIVE      27 min [x]Eval                  []Re-Eval         8 min Therapeutic Exercise:  [x] See flow sheet :HEP   Rationale: increase ROM, increase strength, improve coordination, improve balance and increase proprioception to improve the patients ability to perform ADLs. With   [] TE   [] TA   [] neuro   [] other: Patient Education: [x] Review HEP    [] Progressed/Changed HEP based on:   [] positioning   [] body mechanics   [] transfers   [] heat/ice application    [] other:           Pain Level (0-10 scale) post treatment: 3    ASSESSMENT:   [x]  See Evaluation          Goals:  Short Term Goals: To be accomplished in 1 weeks:  1. Establish HEP for ROM & Strengthening.     Long Term Goals: To be accomplished in 10 treatments:  1. Patient will be independent with HEP for ROM & Strengthening. Eval Status: na  2. Pt will increase R Shoulder passive elevation to 160 degrees to increase ease with ADLs. Eval Status:50 deg  3. Pt will increase R Shoulder active elevation to 120 degrees to increase ease with ADLs. Eval Status:n/a  4. Pt will increase FOTO score to 65 points to demonstrate improved functional lift & carry.    Eval Status:FOTO: 25    PLAN      [x]  Continue plan of care           Elina Chavez PT 4/26/2017  1:41 PM

## 2017-04-28 ENCOUNTER — HOSPITAL ENCOUNTER (OUTPATIENT)
Dept: PHYSICAL THERAPY | Age: 22
Discharge: HOME | End: 2017-04-28
Payer: SELF-PAY

## 2017-04-28 PROCEDURE — 97110 THERAPEUTIC EXERCISES: CPT

## 2017-04-28 PROCEDURE — 97140 MANUAL THERAPY 1/> REGIONS: CPT

## 2017-04-28 PROCEDURE — 97014 ELECTRIC STIMULATION THERAPY: CPT

## 2017-05-01 ENCOUNTER — HOSPITAL ENCOUNTER (OUTPATIENT)
Dept: PHYSICAL THERAPY | Age: 22
Discharge: HOME | End: 2017-05-01
Payer: SELF-PAY

## 2017-05-01 PROCEDURE — 97014 ELECTRIC STIMULATION THERAPY: CPT

## 2017-05-01 PROCEDURE — 97140 MANUAL THERAPY 1/> REGIONS: CPT

## 2017-05-01 PROCEDURE — 97110 THERAPEUTIC EXERCISES: CPT

## 2017-05-01 NOTE — PROGRESS NOTES
PT DAILY TREATMENT NOTE 12-16    Patient Name: Neida Liu  Date:2017  : 1995  [x]  Patient  Verified  Payor: SELF PAY / Plan: [facility] SELF PAY / Product Type: Self Pay /    In time:300  Out time:341  Total Treatment Time (min): 41  Visit #: 3 of 8    Treatment Area: Pain in right shoulder [M25.511]  Acromioclavicular joint separation, right, initial encounter [L90.109S]    SUBJECTIVE  Pain Level (0-10 scale): 2  Any medication changes, allergies to medications, adverse drug reactions, diagnosis change, or new procedure performed?: [x] No    [] Yes (see summary sheet for update)  Subjective functional status/changes:   [] No changes reported  \"I'm doing ok. \"    OBJECTIVE    Modality rationale: decrease edema, decrease inflammation and decrease pain to improve the patients ability to improve ease with mobility.     Min Type Additional Details   10 [x] Estim:  [x]Unatt       [x]IFC  []Premod                        []Other:  [x]w/ice   []w/heat  Position: seated  Location:R shoulder    [] Estim: []Att    []TENS instruct  []NMES                    []Other:  []w/US   []w/ice   []w/heat  Position:  Location:    []  Traction: [] Cervical       []Lumbar                       [] Prone          []Supine                       []Intermittent   []Continuous Lbs:  [] before manual  [] after manual    []  Ultrasound: []Continuous   [] Pulsed                           []1MHz   []3MHz W/cm2:  Location:    []  Iontophoresis with dexamethasone         Location: [] Take home patch   [] In clinic    []  Ice     []  heat  []  Ice massage  []  Laser   []  Anodyne Position:  Location:    []  Laser with stim  []  Other:  Position:  Location:    []  Vasopneumatic Device Pressure:       [] lo [] med [] hi   Temperature: [] lo [] med [] hi   [x] Skin assessment post-treatment:  [x]intact []redness- no adverse reaction    []redness  adverse reaction:       23 min Therapeutic Exercise:  [x] See flow sheet :   Rationale: increase ROM, increase strength and improve coordination to improve the patients ability to improve ease with ADLs. 8 min Manual Therapy:  scap mobs to R scapula, gentle PROM to R shoulder   Rationale: decrease pain, increase ROM, increase tissue extensibility, decrease trigger points and increase postural awareness to improve ease with reaching once cleared for AROM              With   [] TE   [] TA   [] neuro   [] other: Patient Education: [x] Review HEP    [] Progressed/Changed HEP based on:   [] positioning   [] body mechanics   [] transfers   [] heat/ice application    [] other:      Other Objective/Functional Measures:      Pain Level (0-10 scale) post treatment: 2    ASSESSMENT/Changes in Function: Mr. Leyda Lanier did well with gentle passive motion without increase in pain. Noted to be actively using his shoulder at times and reminded of PROM restrictions. Patient will continue to benefit from skilled PT services to modify and progress therapeutic interventions, address functional mobility deficits, address ROM deficits, address strength deficits, analyze and address soft tissue restrictions, analyze and cue movement patterns, analyze and modify body mechanics/ergonomics, assess and modify postural abnormalities, address imbalance/dizziness and instruct in home and community integration to attain remaining goals. []  See Plan of Care  []  See progress note/recertification  []  See Discharge Summary         Progress towards goals / Updated goals:  Short Term Goals: To be accomplished in 1 weeks:  1. Establish HEP for ROM & Strengthening. Daniel Orozco  Long Term Goals: To be accomplished in 10 treatments:  1. Patient will be independent with HEP for ROM & Strengthening. Eval Status: na  Reports compliance  2. Pt will increase R Shoulder passive elevation to 160 degrees to increase ease with ADLs. Eval Status:50 deg  Passively to approximately 90 deg  3.  Pt will increase R Shoulder active elevation to 120 degrees to increase ease with ADLs. Eval Status:n/a  NA  4. Pt will increase FOTO score to 65 points to demonstrate improved functional lift & carry.    Eval Status:FOTO: 18  Assess 4th visit    PLAN  []  Upgrade activities as tolerated     [x]  Continue plan of care  []  Update interventions per flow sheet       []  Discharge due to:_  []  Other:_      Shira Escalante, PT 5/1/2017  3:06 PM    Future Appointments  Date Time Provider Prosper Salinas   5/3/2017 2:00 PM Nuvia Tran PTA [facility]   5/8/2017 2:30 PM Shira Escalante, PT [facility]   5/10/2017 11:00 AM Nuvia Tran PTA [facility]   5/15/2017 10:30 AM Nuvia Tran PTA [facility]   5/17/2017 10:30 AM Nuvia Tran PTA [facility]   5/22/2017 10:30 AM Shira Escalante, PT [facility]   5/24/2017 10:30 AM Shira Escalante PT [facility]

## 2017-05-03 ENCOUNTER — HOSPITAL ENCOUNTER (OUTPATIENT)
Dept: PHYSICAL THERAPY | Age: 22
Discharge: HOME | End: 2017-05-03
Payer: SELF-PAY

## 2017-05-03 PROCEDURE — 97140 MANUAL THERAPY 1/> REGIONS: CPT

## 2017-05-03 PROCEDURE — 97014 ELECTRIC STIMULATION THERAPY: CPT

## 2017-05-03 PROCEDURE — 97110 THERAPEUTIC EXERCISES: CPT

## 2017-05-03 NOTE — PROGRESS NOTES
PT DAILY TREATMENT NOTE     Patient Name: Jelena Flanagan  Date:5/3/2017  : 1995  [x]  Patient  Verified  Payor: SELF PAY / Plan: [facility] SELF PAY / Product Type: Self Pay /    In time:205  Out time:237  Total Treatment Time (min): 32  Visit #: 4 of 8    Treatment Area: Pain in right shoulder [M25.511]  Acromioclavicular joint separation, right, initial encounter [S43.101A]    SUBJECTIVE  Pain Level (0-10 scale): 4/10  Any medication changes, allergies to medications, adverse drug reactions, diagnosis change, or new procedure performed?: [x] No    [] Yes (see summary sheet for update)  Subjective functional status/changes:   [] No changes reported  \"my pain was bad this morning, 7/10. It is a little better now. \"    OBJECTIVE    Modality rationale: decrease inflammation and decrease pain to improve the patients ability to improve mobility.    Min Type Additional Details   10 [x] Estim:  [x]Unatt       [x]IFC  []Premod                        []Other:  [x]w/ice   []w/heat  Position:seated  Location: R shoulder    [] Estim: []Att    []TENS instruct  []NMES                    []Other:  []w/US   []w/ice   []w/heat  Position:  Location:    []  Traction: [] Cervical       []Lumbar                       [] Prone          []Supine                       []Intermittent   []Continuous Lbs:  [] before manual  [] after manual    []  Ultrasound: []Continuous   [] Pulsed                           []1MHz   []3MHz W/cm2:  Location:    []  Iontophoresis with dexamethasone         Location: [] Take home patch   [] In clinic    []  Ice     []  heat  []  Ice massage  []  Laser   []  Anodyne Position:  Location:    []  Laser with stim  []  Other:  Position:  Location:    []  Vasopneumatic Device Pressure:       [] lo [] med [] hi   Temperature: [] lo [] med [] hi   [x] Skin assessment post-treatment:  [x]intact []redness- no adverse reaction    []redness  adverse reaction:     14 min Therapeutic Exercise:  [x] See flow sheet :   Rationale: increase ROM and increase strength to improve the patients ability to perform ADLs. 8 min Manual Therapy:  R scapular mobs and PROM shoulder flexion, grade 1 mobs   Rationale: decrease pain, increase ROM, increase tissue extensibility and increase postural awareness to improve mobility and functional reaching. With   [x] TE   [] TA   [] neuro   [] other: Patient Education: [x] Review HEP    [] Progressed/Changed HEP based on:   [x] positioning   [x] body mechanics   [] transfers   [] heat/ice application    [] other:      Other Objective/Functional Measures: FOTO 22     Pain Level (0-10 scale) post treatment: 1/10    ASSESSMENT/Changes in Function: Pt is guarded and apprehensive. Requires cuing to maintain good posture. Limited PROM with manual therapy secondary to pain and guarding. Patient will continue to benefit from skilled PT services to modify and progress therapeutic interventions, address functional mobility deficits, address ROM deficits, address strength deficits, analyze and address soft tissue restrictions, analyze and cue movement patterns, analyze and modify body mechanics/ergonomics, assess and modify postural abnormalities and instruct in home and community integration to attain remaining goals. [x]  See Plan of Care  []  See progress note/recertification  []  See Discharge Summary         Progress towards goals / Updated goals:  Short Term Goals: To be accomplished in 1 weeks:  1. Establish HEP for ROM & Strengthening. Daniel Orozco  Long Term Goals: To be accomplished in 10 treatments:  1. Patient will be independent with HEP for ROM & Strengthening. Eval Status: na   Reports continued compliance. 2. Pt will increase R Shoulder passive elevation to 160 degrees to increase ease with ADLs. Eval Status:50 deg   Current: Decreased PROM today secondary to pain and guarding.   3. Pt will increase R Shoulder active elevation to 120 degrees to increase ease with ADLs.   Eval Status:n/a   N/A  4. Pt will increase FOTO score to 65 points to demonstrate improved functional lift & carry.     Eval Status:FOTO: 18   Current: FOTO 22       PLAN  []  Upgrade activities as tolerated     [x]  Continue plan of care  []  Update interventions per flow sheet       []  Discharge due to:_  []  Other:_      Marybeth Pierre, SPTA 5/3/2017  2:28 PM    Future Appointments  Date Time Provider Prosper Salinas   5/8/2017 2:30 PM Poncho Linda, PT [facility]   5/10/2017 11:00 AM Cecil Zhang PTA [facility]   5/15/2017 10:30 AM Cecil Zhang, PTA [facility]   5/17/2017 10:30 AM Cecil Zhang, PTA [facility]   5/22/2017 10:30 AM Poncho Linda, PT [facility]   5/24/2017 10:30 AM Poncho Linda, PT [facility]

## 2017-05-06 ENCOUNTER — HOSPITAL ENCOUNTER (EMERGENCY)
Age: 22
Discharge: HOME | End: 2017-05-06
Attending: EMERGENCY MEDICINE
Payer: SELF-PAY

## 2017-05-06 VITALS
DIASTOLIC BLOOD PRESSURE: 97 MMHG | HEART RATE: 89 BPM | RESPIRATION RATE: 16 BRPM | SYSTOLIC BLOOD PRESSURE: 169 MMHG | TEMPERATURE: 98.7 F | HEIGHT: 68 IN | OXYGEN SATURATION: 98 %

## 2017-05-06 DIAGNOSIS — R30.0 DYSURIA: ICD-10-CM

## 2017-05-06 DIAGNOSIS — R11.2 NON-INTRACTABLE VOMITING WITH NAUSEA, UNSPECIFIED VOMITING TYPE: Primary | ICD-10-CM

## 2017-05-06 LAB
APPEARANCE UR: CLEAR
BILIRUB UR QL: NEGATIVE
COLOR UR: YELLOW
GLUCOSE UR STRIP.AUTO-MCNC: NEGATIVE MG/DL
HGB UR QL STRIP: NEGATIVE
KETONES UR QL STRIP.AUTO: ABNORMAL MG/DL
LEUKOCYTE ESTERASE UR QL STRIP.AUTO: NEGATIVE
NITRITE UR QL STRIP.AUTO: NEGATIVE
PH UR STRIP: 8 [PH] (ref 5–8)
PROT UR STRIP-MCNC: NEGATIVE MG/DL
SP GR UR REFRACTOMETRY: 1.03 (ref 1–1.03)
UROBILINOGEN UR QL STRIP.AUTO: 1 EU/DL (ref 0.2–1)

## 2017-05-06 PROCEDURE — 74011250637 HC RX REV CODE- 250/637: Performed by: PHYSICIAN ASSISTANT

## 2017-05-06 PROCEDURE — 74011250636 HC RX REV CODE- 250/636: Performed by: PHYSICIAN ASSISTANT

## 2017-05-06 PROCEDURE — 87491 CHLMYD TRACH DNA AMP PROBE: CPT

## 2017-05-06 PROCEDURE — 96372 THER/PROPH/DIAG INJ SC/IM: CPT

## 2017-05-06 PROCEDURE — 99283 EMERGENCY DEPT VISIT LOW MDM: CPT

## 2017-05-06 PROCEDURE — 74011000250 HC RX REV CODE- 250: Performed by: PHYSICIAN ASSISTANT

## 2017-05-06 PROCEDURE — 81003 URINALYSIS AUTO W/O SCOPE: CPT

## 2017-05-06 RX ORDER — AZITHROMYCIN 250 MG/1
1000 TABLET, FILM COATED ORAL
Status: COMPLETED | OUTPATIENT
Start: 2017-05-06 | End: 2017-05-06

## 2017-05-06 RX ORDER — ONDANSETRON 4 MG/1
4 TABLET, ORALLY DISINTEGRATING ORAL
Qty: 15 TAB | Refills: 0 | Status: SHIPPED | OUTPATIENT
Start: 2017-05-06 | End: 2017-07-11

## 2017-05-06 RX ADMIN — CEFTRIAXONE 250 MG: 250 INJECTION, POWDER, FOR SOLUTION INTRAMUSCULAR; INTRAVENOUS at 17:45

## 2017-05-06 RX ADMIN — AZITHROMYCIN 1000 MG: 250 TABLET, FILM COATED ORAL at 17:39

## 2017-05-06 NOTE — ED NOTES
I have reviewed discharge instructions with the patient. The patient verbalized understanding. Current Discharge Medication List      START taking these medications    Details   ondansetron (ZOFRAN ODT) 4 mg disintegrating tablet Take 1 Tab by mouth every eight (8) hours as needed for Nausea. Qty: 15 Tab, Refills: 0         CONTINUE these medications which have NOT CHANGED    Details   oxyCODONE-acetaminophen (PERCOCET 7.5) 7.5-325 mg per tablet Take 1-2 Tabs by mouth every four (4) hours as needed for Pain. Max Daily Amount: 12 Tabs. Do not take until after surgery  Qty: 60 Tab, Refills: 0      oxyCODONE-acetaminophen (PERCOCET) 5-325 mg per tablet Take 1 Tab by mouth every four (4) hours as needed for Pain. Max Daily Amount: 6 Tabs. Qty: 30 Tab, Refills: 0      ibuprofen (MOTRIN) 600 mg tablet Take 1 Tab by mouth every six (6) hours as needed for Pain.   Qty: 20 Tab, Refills: 0         Patient armband removed and shredded

## 2017-05-06 NOTE — Clinical Note
SPECIFIC PATIENT INSTRUCTIONS FROM THE PROVIDER WHO TREATED YOU IN THE ER TODAY: 
You were treated in for the exposure to a possible STD versus UTI. Take zofran as needed for nausea and vomiting. FOLLOW UP APPOINTMENT:  Your primary doctor in the next  week. Return if any concerns or worsening of condition(s) Note that it takes 48-72 hours to process some of the tests done today. You can obtain results by calling emergency room at [phone].

## 2017-05-06 NOTE — ED TRIAGE NOTES
Pt presents to the ED with urinary pain onset today. Pt reports 1x when awoken, states a second time this afternoon. Pt reports vomiting today, however related vomiting to Percocet for recent right TRISR [facility], [place] separation surgery. Pt denies penile discharge.

## 2017-05-06 NOTE — DISCHARGE INSTRUCTIONS
Nausea and Vomiting: Care Instructions  Your Care Instructions    When you are nauseated, you may feel weak and sweaty and notice a lot of saliva in your mouth. Nausea often leads to vomiting. Most of the time you do not need to worry about nausea and vomiting, but they can be signs of other illnesses. Two common causes of nausea and vomiting are stomach flu and food poisoning. Nausea and vomiting from viral stomach flu will usually start to improve within 24 hours. Nausea and vomiting from food poisoning may last from 12 to 48 hours. The doctor has checked you carefully, but problems can develop later. If you notice any problems or new symptoms, get medical treatment right away. Follow-up care is a key part of your treatment and safety. Be sure to make and go to all appointments, and call your doctor if you are having problems. It's also a good idea to know your test results and keep a list of the medicines you take. How can you care for yourself at home? · To prevent dehydration, drink plenty of fluids, enough so that your urine is light yellow or clear like water. Choose water and other caffeine-free clear liquids until you feel better. If you have kidney, heart, or liver disease and have to limit fluids, talk with your doctor before you increase the amount of fluids you drink. · Rest in bed until you feel better. · When you are able to eat, try clear soups, mild foods, and liquids until all symptoms are gone for 12 to 48 hours. Other good choices include dry toast, crackers, cooked cereal, and gelatin dessert, such as Jell-O. When should you call for help? Call 911 anytime you think you may need emergency care. For example, call if:  · You passed out (lost consciousness). Call your doctor now or seek immediate medical care if:  · You have symptoms of dehydration, such as:  ¨ Dry eyes and a dry mouth. ¨ Passing only a little dark urine.   ¨ Feeling thirstier than usual.  · You have new or worsening belly pain. · You have a new or higher fever. · You vomit blood or what looks like coffee grounds. Watch closely for changes in your health, and be sure to contact your doctor if:  · You have ongoing nausea and vomiting. · Your vomiting is getting worse. · Your vomiting lasts longer than 2 days. · You are not getting better as expected. Where can you learn more? Go to http://shima-espinoza.info/. Enter [phone] [id number] in the search box to learn more about \"Nausea and Vomiting: Care Instructions. \"  Current as of: May 27, 2016  Content Version: 11.2  © 2658-1425 [employer]. Care instructions adapted under license by [employer] (which disclaims liability or warranty for this information). If you have questions about a medical condition or this instruction, always ask your healthcare professional. [address] any warranty or liability for your use of this information.

## 2017-05-06 NOTE — ED PROVIDER NOTES
Patient is a 23 y.o. male presenting with urinary pain. The history is provided by the patient. Urinary Pain    This is a new problem. The current episode started 6 to 12 hours ago. The problem occurs intermittently (2 urinations, but not all). The problem has been resolved. The quality of the pain is described as burning. There has been no fever. He is sexually active (Only with wife, no protection). There is no history of pyelonephritis. Associated symptoms include nausea (With taking pain medications for shoulder) and vomiting. Pertinent negatives include no chills, no sweats, no discharge, no frequency, no hematuria, no hesitancy, no urgency, no flank pain, no penile discharge, no abdominal pain and no back pain. He has tried nothing for the symptoms. His past medical history does not include kidney stones, single kidney, urological procedure, recurrent UTIs, urinary stasis, catheterization or urinary catheter problem. Past Medical History:   Diagnosis Date    Hypertension     no meds       No past surgical history on file. Family History:   Problem Relation Age of Onset    Hypertension Mother     Hypertension Father     Hypertension Maternal Grandmother     Hypertension Maternal Grandfather     Hypertension Paternal Grandmother     Hypertension Paternal Grandfather        Social History     Social History    Marital status: SINGLE     Spouse name: N/A    Number of children: N/A    Years of education: N/A     Occupational History    Not on file. Social History Main Topics    Smoking status: Never Smoker    Smokeless tobacco: Never Used    Alcohol use Yes      Comment: occasionally    Drug use: No    Sexual activity: Yes     Partners: Female     Birth control/ protection: None     Other Topics Concern    Not on file     Social History Narrative         ALLERGIES: Review of patient's allergies indicates no known allergies.     Review of Systems   Constitutional: Negative for chills and fever. HENT: Negative for ear pain, rhinorrhea and sore throat. Eyes: Negative for pain and redness. Respiratory: Negative for cough and shortness of breath. Cardiovascular: Negative for chest pain. Gastrointestinal: Positive for nausea (With taking pain medications for shoulder) and vomiting. Negative for abdominal pain, constipation and diarrhea. Genitourinary: Positive for dysuria. Negative for discharge, flank pain, frequency, genital sores, hematuria, hesitancy, penile discharge, penile pain, penile swelling, scrotal swelling, testicular pain and urgency. Musculoskeletal: Negative for back pain. Skin: Negative. Neurological: Negative for dizziness, light-headedness and headaches. Psychiatric/Behavioral: Negative. Vitals:    05/06/17 1705   BP: (!) 169/97   Pulse: 89   Resp: 16   Temp: 98.7 °F (37.1 °C)   SpO2: 98%   Height: 5' 8\" (1.727 m)            Physical Exam   Constitutional: He is oriented to person, place, and time. He appears well-developed and well-nourished. No distress. HENT:   Head: Normocephalic and atraumatic. Right Ear: Tympanic membrane, external ear and ear canal normal.   Left Ear: Tympanic membrane, external ear and ear canal normal.   Nose: Nose normal.   Mouth/Throat: Oropharynx is clear and moist and mucous membranes are normal. No oropharyngeal exudate. Eyes: Conjunctivae and EOM are normal. Pupils are equal, round, and reactive to light. Neck: Normal range of motion. Cardiovascular: Normal rate, regular rhythm, normal heart sounds and intact distal pulses. Pulmonary/Chest: Effort normal and breath sounds normal.   Abdominal: Soft. Bowel sounds are normal. There is no tenderness. Genitourinary: Penis normal. No penile tenderness. Musculoskeletal: Normal range of motion. Neurological: He is alert and oriented to person, place, and time. Skin: Skin is warm and dry. He is not diaphoretic.    Psychiatric: He has a normal mood and affect. His behavior is normal. Judgment and thought content normal.   Nursing note and vitals reviewed. MDM  Number of Diagnoses or Management Options  Dysuria: new and requires workup  Non-intractable vomiting with nausea, unspecified vomiting type: new and requires workup  Diagnosis management comments: DDx: STD, UTI, priapism, epididymitis, prostatitis, orchitis, scrotal abscess, inguinal hernia, testicular infarction, testicular torsion, Saeed's gangrene, lymphadenopathy, malignancy     ED COURSE:  Urine or urethral specimen(s) may have been collected to check for gonorrhea and chlamydia. Pt notes he has been taking his percocet as prescribed post surg, but he vomits every time he takes it. Will rx zofran. IMPRESSION AND MEDICAL DECISION MAKING:  Based upon the patients presentation with noted HPI and PE, along with the work up done in the emergency department, I believe that the patient is had an STD exposure and will be treated with rocephin and azithromycin. Follow-up Activity limitations:  None  Condition on Discharge:  Stable     SPECIFIC PATIENT INSTRUCTIONS FROM THE PROVIDER WHO TREATED YOU IN THE ER TODAY:  You were treated in for the exposure to a possible STD versus UTI. Take zofran as needed for nausea and vomiting. FOLLOW UP APPOINTMENT:  Your primary doctor in the next week. Return if any concerns or worsening of condition(s)  Note that it takes 48-72 hours to process some of the tests done today. You can obtain results by calling emergency room at [phone]. Pt results have been reviewed with them. They have been counseled regarding diagnosis, treatment, and plan. Pt verbally conveys understanding and agreement of the signs, symptoms, diagnosis, treatment and prognosis and additionally agrees to follow up as discussed. Pt also agrees with the care-plan and conveys that all of their questions have been answered.  I have also provided discharge instructions for them that include: educational information regarding their diagnosis and treatment, and list of reasons why they would want to return to the ED prior to their follow-up appointment, should their condition change. Jenny Baxter PA-C 5:42 PM          Amount and/or Complexity of Data Reviewed  Clinical lab tests: ordered and reviewed  Discussion of test results with the performing providers: yes  Decide to obtain previous medical records or to obtain history from someone other than the patient: yes  Obtain history from someone other than the patient: yes  Review and summarize past medical records: yes  Discuss the patient with other providers: yes    Risk of Complications, Morbidity, and/or Mortality  Presenting problems: low  Diagnostic procedures: low  Management options: low    Patient Progress  Patient progress: stable    ED Course       Procedures    Labs Reviewed   URINALYSIS W/ RFLX MICROSCOPIC - Abnormal; Notable for the following:        Result Value    Ketone TRACE (*)     All other components within normal limits   CHLAMYDIA/NEISSERIA AMPLIFICATION     Diagnosis:   1. Non-intractable vomiting with nausea, unspecified vomiting type    2. Dysuria          Disposition: home    Follow-up Information     Follow up With Details Comments Contact Info    [facility] EMERGENCY DEPT  As needed, If symptoms worsen [address] In 2 days  [address] [place] [address]  [address] [facility]  [phone]          Patient's Medications   Start Taking    ONDANSETRON (ZOFRAN ODT) 4 MG DISINTEGRATING TABLET    Take 1 Tab by mouth every eight (8) hours as needed for Nausea. Continue Taking    IBUPROFEN (MOTRIN) 600 MG TABLET    Take 1 Tab by mouth every six (6) hours as needed for Pain. OXYCODONE-ACETAMINOPHEN (PERCOCET 7.5) 7.5-325 MG PER TABLET    Take 1-2 Tabs by mouth every four (4) hours as needed for Pain.  Max Daily Amount: 12 Tabs. Do not take until after surgery    OXYCODONE-ACETAMINOPHEN (PERCOCET) 5-325 MG PER TABLET    Take 1 Tab by mouth every four (4) hours as needed for Pain. Max Daily Amount: 6 Tabs.    These Medications have changed    No medications on file   Stop Taking    No medications on file

## 2017-05-08 ENCOUNTER — APPOINTMENT (OUTPATIENT)
Dept: PHYSICAL THERAPY | Age: 22
End: 2017-05-08
Payer: SELF-PAY

## 2017-05-09 LAB
C TRACH RRNA SPEC QL NAA+PROBE: NEGATIVE
N GONORRHOEA RRNA SPEC QL NAA+PROBE: NEGATIVE
SPECIMEN SOURCE: NORMAL

## 2017-05-15 ENCOUNTER — APPOINTMENT (OUTPATIENT)
Dept: PHYSICAL THERAPY | Age: 22
End: 2017-05-15
Payer: SELF-PAY

## 2017-05-15 ENCOUNTER — HOSPITAL ENCOUNTER (OUTPATIENT)
Dept: PHYSICAL THERAPY | Age: 22
Discharge: HOME | End: 2017-05-15
Payer: SELF-PAY

## 2017-05-15 PROCEDURE — 97014 ELECTRIC STIMULATION THERAPY: CPT

## 2017-05-15 PROCEDURE — 97140 MANUAL THERAPY 1/> REGIONS: CPT

## 2017-05-15 PROCEDURE — 97110 THERAPEUTIC EXERCISES: CPT

## 2017-05-15 NOTE — PROGRESS NOTES
PT DAILY TREATMENT NOTE     Patient Name: Aquiles Connelly  Date:5/15/2017  : 1995  [x]  Patient  Verified  Payor: SELF PAY / Plan: [facility] SELF PAY / Product Type: Self Pay /    In time:1018  Out time:1049  Total Treatment Time (min): 31  Visit #: 5 of 8    Treatment Area: Pain in right shoulder [M25.511]  Acromioclavicular joint separation, right, initial encounter [J74.969P]    SUBJECTIVE  Pain Level (0-10 scale): 2  Any medication changes, allergies to medications, adverse drug reactions, diagnosis change, or new procedure performed?: [x] No    [] Yes (see summary sheet for update)  Subjective functional status/changes:   [] No changes reported  \"I've been feeling better lately. \"    OBJECTIVE    Modality rationale: decrease pain and increase tissue extensibility to improve the patients ability to improve mobility and reaching once cleared   Min Type Additional Details   10 [x] Estim:  [x]Unatt       [x]IFC  []Premod                        []Other:  [x]w/ice   []w/heat  Position: seated  Location: right shoulder    [] Estim: []Att    []TENS instruct  []NMES                    []Other:  []w/US   []w/ice   []w/heat  Position:  Location:    []  Traction: [] Cervical       []Lumbar                       [] Prone          []Supine                       []Intermittent   []Continuous Lbs:  [] before manual  [] after manual    []  Ultrasound: []Continuous   [] Pulsed                           []1MHz   []3MHz W/cm2:  Location:    []  Iontophoresis with dexamethasone         Location: [] Take home patch   [] In clinic    []  Ice     []  heat  []  Ice massage  []  Laser   []  Anodyne Position:  Location:    []  Laser with stim  []  Other:  Position:  Location:    []  Vasopneumatic Device Pressure:       [] lo [] med [] hi   Temperature: [] lo [] med [] hi   [x] Skin assessment post-treatment:  [x]intact []redness- no adverse reaction    []redness  adverse reaction:     13 min Therapeutic Exercise:  [x] See flow sheet :   Rationale: increase ROM and increase strength to improve the patients ability to perform ADLs    8 min Manual Therapy:  R scapular mobs and PROM shoulder flexion, grade 1 mobs   Rationale: decrease pain, increase ROM, increase tissue extensibility, decrease trigger points and increase postural awareness to improve mobility and functional reaching         With   [x] TE   [] TA   [x] neuro   [] other: Patient Education: [x] Review HEP    [] Progressed/Changed HEP based on:   [x] positioning   [x] body mechanics   [] transfers   [] heat/ice application    [] other:      Other Objective/Functional Measures:      Pain Level (0-10 scale) post treatment: 0    ASSESSMENT/Changes in Function: Pt is progressing well with his ROM passively and continues to adhere to his protocol. We will update based on upcoming MD appointment. Patient will continue to benefit from skilled PT services to modify and progress therapeutic interventions, address functional mobility deficits, address ROM deficits, address strength deficits, analyze and address soft tissue restrictions, analyze and cue movement patterns, analyze and modify body mechanics/ergonomics, assess and modify postural abnormalities, address imbalance/dizziness and instruct in home and community integration to attain remaining goals. [x]  See Plan of Care  []  See progress note/recertification  []  See Discharge Summary         Progress towards goals / Updated goals:  Short Term Goals: To be accomplished in 1 weeks:  1. Establish HEP for ROM & Strengthening. Daniel Orozco  Long Term Goals: To be accomplished in 10 treatments:  1. Patient will be independent with HEP for ROM & Strengthening. Eval Status: na   Reports continued compliance. 2. Pt will increase R Shoulder passive elevation to 160 degrees to increase ease with ADLs. Eval Status:50 deg   Current: Decreased PROM today secondary to pain and guarding.   3. Pt will increase R Shoulder active elevation to 120 degrees to increase ease with ADLs. Eval Status:n/a   N/A  4. Pt will increase FOTO score to 65 points to demonstrate improved functional lift & carry.     Eval Status:FOTO: 18   Current: FOTO 22    PLAN  []  Upgrade activities as tolerated     [x]  Continue plan of care  []  Update interventions per flow sheet       []  Discharge due to:_  []  Other:_      Camille Barbosa PTA, CSCS 5/15/2017  10:55 AM    Future Appointments  Date Time Provider Prosper Salinas   5/15/2017 10:30 AM Camille Barbosa PTA [facility]   5/17/2017 10:30 AM Camille Barbosa PTA [facility]   5/17/2017 1:15 PM [facility]   5/22/2017 10:30 AM Camille Barbosa PTA [facility]   5/24/2017 10:30 AM Heidi Lee, PT [facility]

## 2017-05-17 ENCOUNTER — HOSPITAL ENCOUNTER (OUTPATIENT)
Dept: PHYSICAL THERAPY | Age: 22
Discharge: HOME | End: 2017-05-17
Payer: SELF-PAY

## 2017-05-17 ENCOUNTER — OFFICE VISIT (OUTPATIENT)
Dept: ORTHOPEDIC SURGERY | Age: 22
End: 2017-05-17

## 2017-05-17 VITALS
DIASTOLIC BLOOD PRESSURE: 95 MMHG | SYSTOLIC BLOOD PRESSURE: 146 MMHG | HEART RATE: 89 BPM | WEIGHT: 217 LBS | HEIGHT: 68 IN | BODY MASS INDEX: 32.89 KG/M2 | TEMPERATURE: 97.2 F

## 2017-05-17 DIAGNOSIS — S43.101D ACROMIOCLAVICULAR JOINT SEPARATION, RIGHT, SUBSEQUENT ENCOUNTER: Primary | ICD-10-CM

## 2017-05-17 PROCEDURE — 97140 MANUAL THERAPY 1/> REGIONS: CPT

## 2017-05-17 PROCEDURE — 97014 ELECTRIC STIMULATION THERAPY: CPT

## 2017-05-17 PROCEDURE — 97110 THERAPEUTIC EXERCISES: CPT

## 2017-05-17 NOTE — LETTER
NOTIFICATION RETURN TO WORK / SCHOOL 
 
5/17/2017 1:34 PM 
 
Mr. Misael Urias [address] [place] [id number] To Whom It May Concern: 
 
Roverto Jacobs is currently under the care of [address] [place] [address]. He will return to work on June 9, 2017 with no use of his right arm. If this accomodation cannot be made then he will need to remain on a no duty status until after his follow up appointment in 1 month. If there are questions or concerns please have the patient contact our office. Sincerely, VINICIUS Cassidy

## 2017-05-17 NOTE — PROGRESS NOTES
Misael Urias  1995     HISTORY OF PRESENT ILLNESS  Milton Carmichael is a 23 y.o. male who presents today for evaluation s/p Right shoulder arthroscopic AC joint reconstruction on 4/19/17. Patient has been going to PT. Describes pain as a 1/10. He is only complaining of some tension in the neck. Patient denies any fever, chills, chest pain, shortness of breath or calf pain. There are no new illness or injuries to report since last seen in the office. PHYSICAL EXAM:   Visit Vitals    BP (!) 146/95    Pulse 89    Temp 97.2 °F (36.2 °C) (Oral)    Ht 5' 8\" (1.727 m)    Wt 217 lb (98.4 kg)    BMI 32.99 kg/m2      The patient is a well-developed, well-nourished male in no acute distress. The patient is alert and oriented times three. The patient appears to be well groomed. Mood and affect are normal.  ORTHOPEDIC EXAM of right shoulder:  Inspection: swelling not present,  Bruising not present  Incisions well healed  Passive glenohumeral abduction 0-90 degrees  Stability: Stable  Strength: n/a  2+ distal pulses    IMPRESSION:  S/P Right shoulder arthroscopic AC joint reconstruction. PLAN:   Patient to continue with PROM and PT. Can start arom now. He will discontinue wearing sling. Restrictions include no lifting. He will return to work on the 6/9 with no use of the arm.   RTC 1 month    Scribed by Yudy Candelario [address]) as dictated by NATALYA Armas PA-C [facility] [phone] [facility]

## 2017-05-17 NOTE — PROGRESS NOTES
PT DAILY TREATMENT NOTE     Patient Name: Cristofer Gage  Date:2017  : 1995  [x]  Patient  Verified  Payor: SELF PAY / Plan: [facility] SELF PAY / Product Type: Self Pay /    In time:1025  Out time:1059  Total Treatment Time (min): 34  Visit #: 6 of 8    Treatment Area: Pain in right shoulder [M25.511]  Acromioclavicular joint separation, right, initial encounter [S43.101A]    SUBJECTIVE  Pain Level (0-10 scale): 0  Any medication changes, allergies to medications, adverse drug reactions, diagnosis change, or new procedure performed?: [x] No    [] Yes (see summary sheet for update)  Subjective functional status/changes:   [] No changes reported  \"No pain. \"    OBJECTIVE    Modality rationale: decrease pain and increase tissue extensibility to improve the patients ability to improve mobility and reaching once cleared   Min Type Additional Details   10 [x] Estim:  [x]Unatt       [x]IFC  []Premod                        []Other:  [x]w/ice   []w/heat  Position: seated   Location: R shoulder    [] Estim: []Att    []TENS instruct  []NMES                    []Other:  []w/US   []w/ice   []w/heat  Position:  Location:    []  Traction: [] Cervical       []Lumbar                       [] Prone          []Supine                       []Intermittent   []Continuous Lbs:  [] before manual  [] after manual    []  Ultrasound: []Continuous   [] Pulsed                           []1MHz   []3MHz W/cm2:  Location:    []  Iontophoresis with dexamethasone         Location: [] Take home patch   [] In clinic    []  Ice     []  heat  []  Ice massage  []  Laser   []  Anodyne Position:  Location:    []  Laser with stim  []  Other:  Position:  Location:    []  Vasopneumatic Device Pressure:       [] lo [] med [] hi   Temperature: [] lo [] med [] hi   [x] Skin assessment post-treatment:  [x]intact []redness- no adverse reaction    []redness - adverse reaction:     16 min Therapeutic Exercise:  [x] See flow sheet :   Rationale: increase ROM and increase strength to improve the patients ability to perform ADLs    8 min Manual Therapy:  R scapular mobs and PROM shoulder flexion, grade 1 mobs   Rationale: decrease pain, increase ROM, increase tissue extensibility, decrease trigger points and increase postural awareness to improve mobility          With   [x] TE   [] TA   [x] neuro   [] other: Patient Education: [x] Review HEP    [] Progressed/Changed HEP based on:   [x] positioning   [x] body mechanics   [] transfers   [] heat/ice application    [] other:      Other Objective/Functional Measures:   PROM 153 deg elevation     Pain Level (0-10 scale) post treatment: 0    ASSESSMENT/Changes in Function: Pt is doing very well with his passive motion. Good adherence to current stage in his protocol. Minimal cuing needed for guarding. We will continue to progress per protocol and MD orders. Patient will continue to benefit from skilled PT services to modify and progress therapeutic interventions, address functional mobility deficits, address ROM deficits, address strength deficits, analyze and address soft tissue restrictions, analyze and cue movement patterns, analyze and modify body mechanics/ergonomics, assess and modify postural abnormalities, address imbalance/dizziness and instruct in home and community integration to attain remaining goals. [x]  See Plan of Care  []  See progress note/recertification  []  See Discharge Summary         Progress towards goals / Updated goals:  Short Term Goals: To be accomplished in 1 weeks:  1. Establish HEP for ROM & Strengthening. Daniel Orozco  Long Term Goals: To be accomplished in 10 treatments:  1. Patient will be independent with HEP for ROM & Strengthening. Eval Status: na   Reports continued compliance. 2. Pt will increase R Shoulder passive elevation to 160 degrees to increase ease with ADLs. Eval Status:50 deg   PROGRESSING; 153 deg  3.  Pt will increase R Shoulder active elevation to 120 degrees to increase ease with ADLs. Eval Status:n/a   N/A  4. Pt will increase FOTO score to 65 points to demonstrate improved functional lift & carry.     Eval Status:FOTO: 18   Current: FOTO 22    PLAN  []  Upgrade activities as tolerated     [x]  Continue plan of care  []  Update interventions per flow sheet       []  Discharge due to:_  []  Other:_      Kelsea Cardona PTA, CSCS 5/17/2017  11:00 AM    Future Appointments  Date Time Provider Prosper Salinas   5/17/2017 10:30 AM [facility]   5/17/2017 1:15 PM VINICIUS Cassidy [facility]   5/22/2017 10:30 AM [facility]   5/24/2017 10:30 AM Zurdo Samson, [facility]

## 2017-05-25 ENCOUNTER — APPOINTMENT (OUTPATIENT)
Dept: PHYSICAL THERAPY | Age: 22
End: 2017-05-25
Payer: SELF-PAY

## 2017-05-25 ENCOUNTER — HOSPITAL ENCOUNTER (OUTPATIENT)
Dept: PHYSICAL THERAPY | Age: 22
Discharge: HOME | End: 2017-05-25
Payer: SELF-PAY

## 2017-05-25 PROCEDURE — 97112 NEUROMUSCULAR REEDUCATION: CPT

## 2017-05-25 PROCEDURE — 97110 THERAPEUTIC EXERCISES: CPT

## 2017-05-25 NOTE — PROGRESS NOTES
[facility] [place] [address]  [address], [address] [phone] fax    Physician Update  [x] Progress Note  [] Discharge Summary    Patient name: Doris Valdez Start of Care: 2017   Referral source: Lynn Mclaughlin,* : 1995    Medical Diagnosis: Pain in right shoulder [M25.511]  Acromioclavicular joint separation, right, initial encounter [S43.101A] Onset Date:17    Treatment Diagnosis: s/p R AC joint reconstruction   Prior Hospitalization: see medical history Provider#: [id number]   Medications: Verified on Patient summary List   Comorbidities: HTN  Prior Level of Function: working as  at [email or web address], in school at [employer], 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 Ms. Dianne Us was a pleasure to evaluate s/p R AC joint reconstruction (DOS: 17). He arrives to PT wearing sling appropriately, incisions dry & intact, following passive motion restrictions. He was guarded with gentle passive motion assessment, but cooperative and reports pain in low-level. We will work to progress gentle ROM per MD order, with progression as cleared. As the patient is self-pay, we will start with 2x/week. Should his passive motion not progress over the next week or so, we will increase frequency to 3X/week. Patient will benefit from skilled PT services to address deficits and facilitate return to premorbid activity level and promote improved quality of life.      Progress towards Goals:   Functional Gains: putting on shoes/shirt, reaching a medium shelf, washing, mobility   Functional Deficits: still cant reach all the way behind me,push ups, reach across my body, lift   % improvement: 60%  Pain   Average: 3/10       Best: 0/10     Worst: 4/10  Patient Goal: \"throw a football\"    Short Term Goals: To be accomplished in 1 weeks:  1. Establish HEP for ROM & Strengthening. Daniel Orozco  Long Term Goals: To be accomplished in 10 treatments:  1. Patient will be independent with HEP for ROM & Strengthening. Eval Status: na  Reports continued compliance, have updated to include AAROM and gentle AROM frequently through the day  2. Pt will increase R Shoulder passive elevation to 160 degrees to increase ease with ADLs. Eval Status:50 deg     3. Pt will increase R Shoulder active elevation to 120 degrees to increase ease with ADLs. Eval Status:n/a    4. Pt will increase FOTO score to 65 points to demonstrate improved functional lift & carry. Eval Status:FOTO: 18  Current: 61    Goals: to be achieved in 4 weeks:  1. Patient will be independent with HEP for ROM & Strengthening. Re-Eval Status: Reports continued compliance, have updated to include AAROM and gentle AROM frequently through the day  2. Pt will increase R Shoulder strength to allow 5# overhead repeat lifts, 16# box carry  Re Eval Status:NT as he has just been cleared from AROM   3. Pt will increase R Shoulder active elevation to 160 degrees to increase ease with ADLs. Re-Eval Status: 136  4. Pt will increase FOTO score to 65 points to demonstrate improved functional lift & carry. Re Eval Status:FOTO: 64      ASSESSMENT/RECOMMENDATIONS:Cliff returns today for his first visit following being cleared for AROM. He is progressing well, and made the transition with ease. We will continue to progress available ROM and supervise his return to AROM with attention to proper mechanics. He should continue to do well.   [x]Continue therapy per initial plan/protocol at a frequency of  2 x per week for 8 weeks  []Continue therapy with the following recommended changes:_____________________ _____________________________________________________________________  []Discontinue therapy progressing towards or have reached established goals  []Discontinue therapy due to lack of appreciable progress towards goals  []Discontinue therapy due to lack of attendance or compliance  []Await Physician's recommendations/decisions regarding therapy  []Other:________________________________________________________________    Thank you for this referral.   Bria Vazquez, PT 5/25/2017 2:31 PM  NOTE TO PHYSICIAN:  [address] AND   FAX TO [facility]: [phone]  If you are unable to process this request in 24 hours please contact our office: [phone]    []  I have read the above report and request that my patient continue as recommended. []  I have read the above report and request that my patient continue therapy with the following changes/special instructions:________________________________________  []I have read the above report and request that my patient be discharged from therapy.     Physicians signature: ______________________________Date: _____Time:_______

## 2017-05-25 NOTE — PROGRESS NOTES
PT DAILY TREATMENT NOTE     Patient Name: Venkata Latham  Date:2017  : 1995  [x]  Patient  Verified  Payor: SELF PAY / Plan: [facility] SELF PAY / Product Type: Self Pay /    In time:224  Out time:300  Total Treatment Time (min): 36  Visit #: 1 of 8    Treatment Area: Pain in right shoulder [M25.511]  Acromioclavicular joint separation, right, initial encounter [V64.474N]    SUBJECTIVE  Pain Level (0-10 scale): 4  Any medication changes, allergies to medications, adverse drug reactions, diagnosis change, or new procedure performed?: [x] No    [] Yes (see summary sheet for update)  Subjective functional status/changes:   [] No changes reported  i haven't been using it too much at home     OBJECTIVE    Modality rationale: Patient declined d/t time contraints   Min Type Additional Details         24 min Therapeutic Exercise:  [x] See flow sheet :  Increased AAROM for progression    Rationale: increase ROM and increase strength to improve the patients ability to perform ADLs     10 min Neuromuscular Re-education:  [x]  See flow sheet :  Upgraded scap activity through greater range as noted on flow sheet    Rationale: increase ROM, increase strength, improve coordination and increase proprioception  to improve the patients ability to regain functional reach     2 min Manual Therapy:  Gentle oscillations through elevation    Rationale: decrease pain, increase ROM, increase tissue extensibility, decrease trigger points and increase postural awareness to improve mobility                 With   [x] TE   [] TA   [x] neuro   [] other: Patient Education: [x] Review HEP    [x] Progressed/Changed HEP based on: verbal instruction on AROM in pain free low level function, AAROM for range [] positioning   [] body mechanics   [] transfers   [] heat/ice application    [] other:      Other Objective/Functional Measures: FOTO 61     Pain Level (0-10 scale) post treatment: 1    ASSESSMENT  [x]  See progress note/recertification  []  See Discharge Summary         Progress towards goals / Updated goals:  1. Patient will be independent with HEP for ROM & Strengthening. Re-Eval Status: Reports continued compliance, have updated to include AAROM and gentle AROM frequently through the day  2. Pt will increase R Shoulder strength to allow 5# overhead repeat lifts, 16# box carry  Re Eval Status:NT as he has just been cleared from AROM   3. Pt will increase R Shoulder active elevation to 160 degrees to increase ease with ADLs. Re-Eval Status: 136  4. Pt will increase FOTO score to 65 points to demonstrate improved functional lift & carry.    Re Eval Status:FOTO: 64    PLAN  []  Upgrade activities as tolerated     [x]  Continue plan of care  []  Update interventions per flow sheet       []  Discharge due to:_  []  Other:_      Jeremias Renee PT 5/25/2017  2:29 PM    Future Appointments  Date Time Provider Prosper Salinas   5/25/2017 2:30 PM [facility]

## 2017-06-02 ENCOUNTER — TELEPHONE (OUTPATIENT)
Dept: ORTHOPEDIC SURGERY | Age: 22
End: 2017-06-02

## 2017-06-02 NOTE — TELEPHONE ENCOUNTER
Pt states his employer,[employer], does not have work available for light duty status and cannot return to work until can be full duty status. Pt states he has 3 more weeks of physical therapy. Pt requesting letter to return to work in 3 weeks. Please call pt when letter is ready.    Pt p#[phone]

## 2017-06-02 NOTE — TELEPHONE ENCOUNTER
New work note written and patient notified he could pick it up at whichever office is convenient for him.

## 2017-06-07 ENCOUNTER — HOSPITAL ENCOUNTER (OUTPATIENT)
Dept: PHYSICAL THERAPY | Age: 22
Discharge: HOME | End: 2017-06-07
Payer: SELF-PAY

## 2017-06-07 ENCOUNTER — TELEPHONE (OUTPATIENT)
Dept: ORTHOPEDIC SURGERY | Age: 22
End: 2017-06-07

## 2017-06-07 PROCEDURE — 97140 MANUAL THERAPY 1/> REGIONS: CPT

## 2017-06-07 PROCEDURE — 97110 THERAPEUTIC EXERCISES: CPT

## 2017-06-07 NOTE — PROGRESS NOTES
PT DAILY TREATMENT NOTE     Patient Name: Jelena Erps  Date:2017  : 1995  [x]  Patient  Verified  Payor: SELF PAY / Plan: [facility]ELF PAY / Product Type: Self Pay /    In time:300  Out time:338  Total Treatment Time (min): 38  Visit #: 1 of 8    Treatment Area: Pain in right shoulder [M25.511]  Acromioclavicular joint separation, right, initial encounter [P36.907L]    SUBJECTIVE  Pain Level (0-10 scale): 2  Any medication changes, allergies to medications, adverse drug reactions, diagnosis change, or new procedure performed?: [x] No    [] Yes (see summary sheet for update)  Subjective functional status/changes:   [] No changes reported  \"I'm sore but not terrible. \"    OBJECTIVE    Modality rationale: PD   Min Type Additional Details    [] Estim:  []Unatt       []IFC  []Premod                        []Other:  []w/ice   []w/heat  Position:  Location:    [] Estim: []Att    []TENS instruct  []NMES                    []Other:  []w/US   []w/ice   []w/heat  Position:  Location:    []  Traction: [] Cervical       []Lumbar                       [] Prone          []Supine                       []Intermittent   []Continuous Lbs:  [] before manual  [] after manual    []  Ultrasound: []Continuous   [] Pulsed                           []1MHz   []3MHz W/cm2:  Location:    []  Iontophoresis with dexamethasone         Location: [] Take home patch   [] In clinic    []  Ice     []  heat  []  Ice massage  []  Laser   []  Anodyne Position:  Location:    []  Laser with stim  []  Other:  Position:  Location:    []  Vasopneumatic Device Pressure:       [] lo [] med [] hi   Temperature: [] lo [] med [] hi   [] Skin assessment post-treatment:  []intact []redness- no adverse reaction    []redness  adverse reaction:       30 min Therapeutic Exercise:  [x] See flow sheet :   Rationale: increase ROM, increase strength and improve coordination to improve the patients ability to perform ADLs.      8 min Manual Therapy: scap mobs to R scapula, gentle PROM to R shoulder   Rationale: decrease pain, increase ROM, increase tissue extensibility, decrease trigger points and increase postural awareness to improve ROM for reaching. With   [] TE   [] TA   [] neuro   [] other: Patient Education: [x] Review HEP    [] Progressed/Changed HEP based on:   [] positioning   [] body mechanics   [] transfers   [] heat/ice application    [] other:      Other Objective/Functional Measures:      Pain Level (0-10 scale) post treatment: 0    ASSESSMENT/Changes in Function: Mr. Maxime Galeano did well with progression to AROM activities without increase in pain. Patient will continue to benefit from skilled PT services to modify and progress therapeutic interventions, address functional mobility deficits, address ROM deficits, address strength deficits, analyze and address soft tissue restrictions, analyze and cue movement patterns, analyze and modify body mechanics/ergonomics, assess and modify postural abnormalities, address imbalance/dizziness and instruct in home and community integration to attain remaining goals. []  See Plan of Care  []  See progress note/recertification  []  See Discharge Summary         Progress towards goals / Updated goals:  1. Patient will be independent with HEP for ROM & Strengthening. Re-Eval Status: Reports continued compliance, have updated to include AAROM and gentle AROM frequently through the day  2. Pt will increase R Shoulder strength to allow 5# overhead repeat lifts, 16# box carry   Re Eval Status:NT as he has just been cleared from AROM   3. Pt will increase R Shoulder active elevation to 160 degrees to increase ease with ADLs. Re-Eval Status: 136  4. Pt will increase FOTO score to 65 points to demonstrate improved functional lift & carry.     Re Eval Status:FOTO: 64    PLAN  []  Upgrade activities as tolerated     [x]  Continue plan of care  []  Update interventions per flow sheet       [] Discharge due to:_  []  Other:_      Vick Gutierrez, PT 6/7/2017  3:09 PM    No future appointments.

## 2017-06-07 NOTE — TELEPHONE ENCOUNTER
ELFEGO FROM [address] [place] [address] CALLED FOR . ELFEGO SAID THE PATIENT RECEIVED A NOTE FROM  FOR WORK , AND  SHE NEEDS TO ASK  SOME QUESTIONS ABOUT THE NOTE. ELFEGO TEL. [phone].

## 2017-06-08 NOTE — TELEPHONE ENCOUNTER
Please figure out what she needs. His restrictions are no walking/standing >10mins. Mostly sedentary duty. No crawling, kneeling, ladders, climbing.

## 2017-06-08 NOTE — TELEPHONE ENCOUNTER
I spoke with Saul Chang at the number listed in the previous message. The only question that needed to be answered #1. When was patients last appointment  #2. When is patients follow up appointment. If any other questions need to be answered Mireya, the  will call us back.

## 2017-06-12 ENCOUNTER — DOCUMENTATION ONLY (OUTPATIENT)
Dept: ORTHOPEDIC SURGERY | Age: 22
End: 2017-06-12

## 2017-07-05 NOTE — PROGRESS NOTES
[facility] [address] [facility] [address], [address] [phone] fax    Discharge Summary  Patient name: Christopher Gutiérrez Start of Care: 2017   Referral source: Akil Pineda,* : 1995    Medical Diagnosis: Pain in right shoulder [M25.511]  Acromioclavicular joint separation, right, initial encounter [S43.101A] Onset Date:17    Treatment Diagnosis: s/p R AC joint reconstruction   Prior Hospitalization: see medical history Provider#: [id number]   Medications: Verified on Patient summary List   Comorbidities: HTN  Prior Level of Function: working as  at [employer], in school at [employer], 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 Urias did well with progression to AROM activities without increase in pain \"    Goals had just been updated on 17, and were not addressed on his last visit. He did not return calls to schedule, and will be discharged at this time due to lapse in therapy. Should he need further therapy we will be happy to see him for evaluation with updated orders. Goals unmet, present status unknown.           RECOMMENDATIONS:  [x]Discontinue therapy: []Patient has reached or is progressing toward set goals      [x]Patient  has abdicated      []Due to lack of appreciable progress towards set goals    Mari Madera, PT 2017 9:21 AM

## 2017-07-11 ENCOUNTER — APPOINTMENT (OUTPATIENT)
Dept: GENERAL RADIOLOGY | Age: 22
End: 2017-07-11
Attending: PHYSICIAN ASSISTANT
Payer: SELF-PAY

## 2017-07-11 ENCOUNTER — HOSPITAL ENCOUNTER (EMERGENCY)
Age: 22
Discharge: HOME | End: 2017-07-11
Attending: EMERGENCY MEDICINE
Payer: SELF-PAY

## 2017-07-11 VITALS
HEART RATE: 111 BPM | SYSTOLIC BLOOD PRESSURE: 144 MMHG | RESPIRATION RATE: 18 BRPM | BODY MASS INDEX: 36.07 KG/M2 | TEMPERATURE: 99.9 F | DIASTOLIC BLOOD PRESSURE: 96 MMHG | WEIGHT: 238 LBS | HEIGHT: 68 IN | OXYGEN SATURATION: 98 %

## 2017-07-11 DIAGNOSIS — R22.0 LEFT FACIAL SWELLING: ICD-10-CM

## 2017-07-11 DIAGNOSIS — S63.601A SPRAIN OF RIGHT THUMB, UNSPECIFIED SITE OF FINGER, INITIAL ENCOUNTER: Primary | ICD-10-CM

## 2017-07-11 PROCEDURE — 74011250637 HC RX REV CODE- 250/637: Performed by: PHYSICIAN ASSISTANT

## 2017-07-11 PROCEDURE — 73130 X-RAY EXAM OF HAND: CPT

## 2017-07-11 PROCEDURE — 99283 EMERGENCY DEPT VISIT LOW MDM: CPT

## 2017-07-11 RX ORDER — HYDROCODONE BITARTRATE AND ACETAMINOPHEN 5; 325 MG/1; MG/1
2 TABLET ORAL
Status: COMPLETED | OUTPATIENT
Start: 2017-07-11 | End: 2017-07-11

## 2017-07-11 RX ORDER — HYDROCODONE BITARTRATE AND ACETAMINOPHEN 5; 325 MG/1; MG/1
1 TABLET ORAL
Qty: 12 TAB | Refills: 0 | Status: SHIPPED | OUTPATIENT
Start: 2017-07-11 | End: 2018-01-27

## 2017-07-11 RX ADMIN — HYDROCODONE BITARTRATE AND ACETAMINOPHEN 2 TABLET: 5; 325 TABLET ORAL at 18:33

## 2017-07-11 NOTE — ED TRIAGE NOTES
C/o right thumb injury while wrestling with his friend. States fell on some steps, his friend caught him. States struck left cheek on metal step then rolled over onto his thumb. Denies any LOC. Swelling noted to left cheek.

## 2017-07-11 NOTE — ED PROVIDER NOTES
HPI Comments: Patient is a 22 y/o male who presents to the ER c/o right thumb pain and facial pain. Patient states he was \"playing around with my home-boy\", when he fell to the ground injuring his right thumb. Patient also states he struck his face on the metal step. He denied any LOC from the fall. Patient states he is right hand dominant. He has not tried using ice or taking any meds for his pain. No other symptoms or complaints. Patient is a 23 y.o. male presenting with finger pain and facial pain. The history is provided by the patient. Finger Pain    This is a new problem. Facial Pain    Pertinent negatives include no vomiting and no weakness. Past Medical History:   Diagnosis Date    Hypertension     no meds       Past Surgical History:   Procedure Laterality Date    HX ORTHOPAEDIC Right     shoulder         Family History:   Problem Relation Age of Onset    Hypertension Mother     Hypertension Father     Hypertension Maternal Grandmother     Hypertension Maternal Grandfather     Hypertension Paternal Grandmother     Hypertension Paternal Grandfather        Social History     Social History    Marital status: SINGLE     Spouse name: N/A    Number of children: N/A    Years of education: N/A     Occupational History    Not on file. Social History Main Topics    Smoking status: Never Smoker    Smokeless tobacco: Never Used    Alcohol use Yes      Comment: occasionally    Drug use: No    Sexual activity: Yes     Partners: Female     Birth control/ protection: None     Other Topics Concern    Not on file     Social History Narrative         ALLERGIES: Review of patient's allergies indicates no known allergies. Review of Systems   Constitutional: Negative for chills, fatigue and fever. HENT: Negative. Negative for sore throat. Eyes: Negative. Respiratory: Negative for cough and shortness of breath. Cardiovascular: Negative for chest pain and palpitations. Gastrointestinal: Negative for abdominal pain, nausea and vomiting. Genitourinary: Negative for dysuria. Musculoskeletal: Positive for arthralgias and joint swelling. Right thumb pain   Skin: Negative. Neurological: Negative for dizziness, weakness, light-headedness and headaches. Psychiatric/Behavioral: Negative. All other systems reviewed and are negative. Vitals:    07/11/17 1713   BP: (!) 144/96   Pulse: (!) 111   Resp: 18   Temp: 99.9 °F (37.7 °C)   SpO2: 98%   Weight: 108 kg (238 lb)   Height: 5' 8\" (1.727 m)            Physical Exam   Constitutional: He is oriented to person, place, and time. He appears well-developed and well-nourished. No distress. HENT:   Head: Normocephalic. Head is without raccoon's eyes and without Quintana's sign. Mouth/Throat: Oropharynx is clear and moist.   Eyes: Conjunctivae and EOM are normal. Pupils are equal, round, and reactive to light. No scleral icterus. Neck: Neck supple. No JVD present. No tracheal deviation present. Cardiovascular: Regular rhythm and normal heart sounds. Tachycardia present. Pulmonary/Chest: Effort normal and breath sounds normal. No respiratory distress. He has no wheezes. Abdominal: Soft. There is no tenderness. Musculoskeletal:        Right hand: He exhibits tenderness and swelling. He exhibits normal capillary refill and no deformity. Hands:  Neurological: He is alert and oriented to person, place, and time. He has normal strength. Gait normal. GCS eye subscore is 4. GCS verbal subscore is 5. GCS motor subscore is 6. Skin: Skin is warm and dry. He is not diaphoretic. Psychiatric: He has a normal mood and affect. Nursing note and vitals reviewed. MDM  Number of Diagnoses or Management Options  Diagnosis management comments: 5:51 PM  24 y/o male c/o right thumb pain and facial swelling s/p fall.   States was playing around with friend, when he fell to the ground injuring right thumb and striking face on metal step. No LOC. Will plan on xray of right hand and reeval.  Ice provided in triage. Nilton Steward PA-C    6:15 PM  Xray negative for acute fracture. Based on pain over snuffbox, will still place pt in thumb spica for support. Will have f/u with ortho if no improvement in symptoms. Ice as needed for swelling. All questions answered and patient in agreement with plan of care. Will plan for discharge. Nilton Steward PA-C      Clinical Impression:  Right thumb sprain, facial swelling, elevated blood pressure reading    One or more blood pressure readings were noted elevated during the Pt's presentation in the emergency department this date. This abnormal reading has been cited in the Pt's diagnosis, and they have been encouraged to follow up with their primary care physician, or referred to a consultant for further evaluation and treatment. Amount and/or Complexity of Data Reviewed  Tests in the radiology section of CPT®: ordered and reviewed    Risk of Complications, Morbidity, and/or Mortality  Presenting problems: low  Diagnostic procedures: low  Management options: low    Patient Progress  Patient progress: stable    ED Course       Procedures           Vitals:  Patient Vitals for the past 12 hrs:   Temp Pulse Resp BP SpO2   07/11/17 1713 99.9 °F (37.7 °C) (!) 111 18 (!) 144/96 98 %         Medications ordered:   Medications - No data to display      Lab findings:  No results found for this or any previous visit (from the past 12 hour(s)). EKG interpretation by ED Physician:      X-Ray, CT or other radiology findings or impressions:  XR HAND RT MIN 3 V    (Results Pending)     Negative for acute fracture, dislocation       Disposition:  Diagnosis:   1. Sprain of right thumb, unspecified site of finger, initial encounter    2.  Left facial swelling        Disposition: Discharged    Follow-up Information     Follow up With Details Comments Contact Info    [facility] EMERG[facility]  If symptoms worsen [address] [facility]  [address] [place] [address] Call in 1 week follow up of right thumb injury [address]  [phone]           Patient's Medications   Start Taking    HYDROCODONE-ACETAMINOPHEN (NORCO) 5-325 MG PER TABLET    Take 1 Tab by mouth every four (4) hours as needed for Pain. Max Daily Amount: 6 Tabs. Continue Taking    No medications on file   These Medications have changed    No medications on file   Stop Taking    IBUPROFEN (MOTRIN) 600 MG TABLET    Take 1 Tab by mouth every six (6) hours as needed for Pain. ONDANSETRON (ZOFRAN ODT) 4 MG DISINTEGRATING TABLET    Take 1 Tab by mouth every eight (8) hours as needed for Nausea. OXYCODONE-ACETAMINOPHEN (PERCOCET 7.5) 7.5-325 MG PER TABLET    Take 1-2 Tabs by mouth every four (4) hours as needed for Pain. Max Daily Amount: 12 Tabs. Do not take until after surgery    OXYCODONE-ACETAMINOPHEN (PERCOCET) 5-325 MG PER TABLET    Take 1 Tab by mouth every four (4) hours as needed for Pain. Max Daily Amount: 6 Tabs.

## 2017-07-11 NOTE — LETTER
[facility] EMERGENCY DEPT 
[address] [facility] [address] 
[phone] Work/School Note Date: 7/11/2017 To Whom It May concern: 
 
Jonathan Garrison was seen and treated today in the emergency room by the following provider(s): 
Attending Provider: Porfirio Nelson DO Physician Assistant: Brigette Barnard PA-C. Jonathan Garrison may return to work on 7/13/2017 or sooner if symptoms markedly improve. Sincerely, Brigette Barnard PA-C

## 2017-07-11 NOTE — DISCHARGE INSTRUCTIONS
Thumb Sprain: Rehab Exercises  Your Care Instructions  Here are some examples of typical rehabilitation exercises for your condition. Start each exercise slowly. Ease off the exercise if you start to have pain. Your doctor or your physical or occupational therapist will tell you when you can start these exercises. He or she will also tell you which ones will work best for you. How to do the exercises  Thumb IP flexion    1. Place your forearm and hand on a table with your affected thumb pointing up. 2. With your other hand, hold your thumb steady just below the joint nearest your thumbnail. 3. Bend the tip of your thumb downward, then straighten it. 4. Repeat 8 to 12 times. Thumb MP flexion    1. Place your forearm and hand on a table with your affected thumb pointing up. 2. With your other hand, hold the base of your thumb and palm steady. 3. Bend your thumb downward where it meets your palm, then straighten it. 4. Repeat 8 to 12 times. Thumb opposition    1. With your affected hand, point your fingers and thumb straight up. Your wrist should be relaxed, following the line of your fingers and thumb. 2. Touch your affected thumb to each finger, one finger at a time. This will look like an \"okay\" sign, but try to keep your other fingers straight and pointing upward as much as you can. 3. Repeat 8 to 12 times. Follow-up care is a key part of your treatment and safety. Be sure to make and go to all appointments, and call your doctor if you are having problems. It's also a good idea to know your test results and keep a list of the medicines you take. Where can you learn more? Go to http://shima-espinoza.info/. Enter D278 in the search box to learn more about \"Thumb Sprain: Rehab Exercises. \"  Current as of: March 21, 2017  Content Version: 11.3  © 8546-7847 [employer].  Care instructions adapted under license by [employer] (which disclaims liability or warranty for this information). If you have questions about a medical condition or this instruction, always ask your healthcare professional. [place] [address] any warranty or liability for your use of this information.

## 2018-01-27 ENCOUNTER — HOSPITAL ENCOUNTER (EMERGENCY)
Age: 23
Discharge: HOME | End: 2018-01-27
Attending: EMERGENCY MEDICINE
Payer: COMMERCIAL

## 2018-01-27 VITALS
BODY MASS INDEX: 33.34 KG/M2 | RESPIRATION RATE: 20 BRPM | WEIGHT: 220 LBS | TEMPERATURE: 98.8 F | SYSTOLIC BLOOD PRESSURE: 149 MMHG | DIASTOLIC BLOOD PRESSURE: 101 MMHG | OXYGEN SATURATION: 99 % | HEART RATE: 73 BPM | HEIGHT: 68 IN

## 2018-01-27 DIAGNOSIS — Z20.2 STD EXPOSURE: Primary | ICD-10-CM

## 2018-01-27 LAB
APPEARANCE UR: CLEAR
BILIRUB UR QL: NEGATIVE
COLOR UR: YELLOW
GLUCOSE UR STRIP.AUTO-MCNC: NEGATIVE MG/DL
HGB UR QL STRIP: NEGATIVE
KETONES UR QL STRIP.AUTO: ABNORMAL MG/DL
LEUKOCYTE ESTERASE UR QL STRIP.AUTO: ABNORMAL
NITRITE UR QL STRIP.AUTO: NEGATIVE
PH UR STRIP: 5.5 [PH] (ref 5–8)
PROT UR STRIP-MCNC: NEGATIVE MG/DL
SP GR UR REFRACTOMETRY: 1.03 (ref 1–1.03)
UROBILINOGEN UR QL STRIP.AUTO: 1 EU/DL (ref 0.2–1)
WBC URNS QL MICRO: NORMAL /HPF (ref 0–4)

## 2018-01-27 PROCEDURE — 74011000250 HC RX REV CODE- 250: Performed by: EMERGENCY MEDICINE

## 2018-01-27 PROCEDURE — 96372 THER/PROPH/DIAG INJ SC/IM: CPT

## 2018-01-27 PROCEDURE — 81001 URINALYSIS AUTO W/SCOPE: CPT | Performed by: EMERGENCY MEDICINE

## 2018-01-27 PROCEDURE — 74011250636 HC RX REV CODE- 250/636: Performed by: EMERGENCY MEDICINE

## 2018-01-27 PROCEDURE — 99283 EMERGENCY DEPT VISIT LOW MDM: CPT

## 2018-01-27 PROCEDURE — 87491 CHLMYD TRACH DNA AMP PROBE: CPT | Performed by: EMERGENCY MEDICINE

## 2018-01-27 PROCEDURE — 74011250637 HC RX REV CODE- 250/637: Performed by: EMERGENCY MEDICINE

## 2018-01-27 RX ORDER — CEFTRIAXONE 250 MG/8ML
250 INJECTION, POWDER, FOR SOLUTION INTRAMUSCULAR; INTRAVENOUS
Status: DISCONTINUED | OUTPATIENT
Start: 2018-01-27 | End: 2018-01-27

## 2018-01-27 RX ORDER — AZITHROMYCIN 250 MG/1
1000 TABLET, FILM COATED ORAL
Status: COMPLETED | OUTPATIENT
Start: 2018-01-27 | End: 2018-01-27

## 2018-01-27 RX ADMIN — LIDOCAINE HYDROCHLORIDE 250 MG: 10 INJECTION, SOLUTION EPIDURAL; INFILTRATION; INTRACAUDAL; PERINEURAL at 20:14

## 2018-01-27 RX ADMIN — AZITHROMYCIN 1000 MG: 250 TABLET, FILM COATED ORAL at 20:14

## 2018-01-28 NOTE — ED TRIAGE NOTES
Pt. Reports exposure to STD about 2 weeks ago, he states a female he was with called him and said she had something.

## 2018-01-28 NOTE — ED NOTES
I have reviewed discharge instructions with the patient. The patient verbalized understanding. Encouraged patient to voice any concerns with reassurance provided. Patient armband removed and shredded    Patient Discharged in stable condition.

## 2018-01-28 NOTE — ED PROVIDER NOTES
EMERGENCY DEPARTMENT HISTORY AND PHYSICAL EXAM    8:00 PM      Date: 1/27/2018  Patient Name: Paula Livingston    History of Presenting Illness     Chief Complaint   Patient presents with    Exposure to STD         History Provided By: Patient    Chief Complaint: Exposure to STD  Duration: 2 Weeks  Timing: none  Location:   Quality: none  Severity: none  Modifying Factors: sexual partner called stating she had STD  Associated Symptoms: denies any other associated signs or symptoms      Additional History (Context): Paula Livingston is a 23 y.o. male with hypertension who presents with c/o of being exposed to STD 2 weeks ago. Pt states the female sexual partner he was with called stating she had STD. Pt denies dysuria or penile lesions. Denies any known drug allergies. As the patient is without physical symptoms or complaints of pain, there is no severity of pain, quality of pain, duration, or associated signs and symptoms regarding the pt's presenting complaint. PCP: None        Past History     Past Medical History:  Past Medical History:   Diagnosis Date    Hypertension     no meds       Past Surgical History:  Past Surgical History:   Procedure Laterality Date    HX ORTHOPAEDIC Right     shoulder       Family History:  Family History   Problem Relation Age of Onset    Hypertension Mother     Hypertension Father     Hypertension Maternal Grandmother     Hypertension Maternal Grandfather     Hypertension Paternal Grandmother     Hypertension Paternal Grandfather        Social History:  Social History   Substance Use Topics    Smoking status: Never Smoker    Smokeless tobacco: Never Used    Alcohol use Yes      Comment: occasionally       Allergies:  No Known Allergies      Review of Systems       Review of Systems   Constitutional: Negative. Negative for fever. HENT: Negative. Eyes: Negative. Respiratory: Negative. Cardiovascular: Negative. Gastrointestinal: Negative. Endocrine: Negative. Genitourinary: Negative. Negative for dysuria. Musculoskeletal: Negative. Skin: Negative. Allergic/Immunologic: Negative. Neurological: Negative. Hematological: Negative. Psychiatric/Behavioral: Negative. All other systems reviewed and are negative. Physical Exam     Visit Vitals    BP (!) 149/101 (BP 1 Location: Left arm, BP Patient Position: At rest)    Pulse 73    Temp 98.8 °F (37.1 °C)    Resp 20    Ht 5' 8\" (1.727 m)    Wt 99.8 kg (220 lb)    SpO2 99%    BMI 33.45 kg/m2         Physical Exam   Constitutional: He is oriented to person, place, and time. He appears well-developed and well-nourished. No distress. HENT:   Head: Normocephalic. Mouth/Throat: Oropharynx is clear and moist.   Eyes: Conjunctivae and EOM are normal. Pupils are equal, round, and reactive to light. Neck: Normal range of motion. Neck supple. Cardiovascular: Normal rate, regular rhythm, normal heart sounds and intact distal pulses. No murmur heard. Pulmonary/Chest: Effort normal and breath sounds normal. No respiratory distress. He has no wheezes. He has no rales. He exhibits no tenderness. Abdominal: Soft. Bowel sounds are normal. He exhibits no distension. There is no tenderness. There is no rebound. Musculoskeletal: Normal range of motion. He exhibits no edema or tenderness. Neurological: He is alert and oriented to person, place, and time. No cranial nerve deficit. He exhibits normal muscle tone. Coordination normal.   Skin: Skin is warm and dry. No rash noted. Psychiatric: He has a normal mood and affect. His behavior is normal. Judgment and thought content normal.   Nursing note and vitals reviewed. : normal penis, no lesions, testes - descended      Diagnostic Study Results       Medical Decision Making   I am the first provider for this patient.     I reviewed the vital signs, available nursing notes, past medical history, past surgical history, family history and social history. Vital Signs-Reviewed the patient's vital signs. Pulse Oximetry Analysis -  99% on room air (Interpretation) Normal    Cardiac Monitor:  Rate: 73 bpm  Rhythm:  Normal Sinus Rhythm     Records Reviewed: Nursing Notes (Time of Review: 8:02 PM)    ED Course: Progress Notes, Reevaluation, and Consults:  Patient remained stable. Provider Notes (Medical Decision Making): UTI, urethritis, STD infection    Diagnosis     Clinical Impression: No diagnosis found. Disposition: Discharge    Follow-up Information     None           Patient's Medications   Start Taking    No medications on file   Continue Taking    No medications on file   These Medications have changed    No medications on file   Stop Taking    HYDROCODONE-ACETAMINOPHEN (NORCO) 5-325 MG PER TABLET    Take 1 Tab by mouth every four (4) hours as needed for Pain. Max Daily Amount: 6 Tabs.     _______________________________    Attestations:  Scribe Attestation     Silver Kyle acting as a scribe for and in the presence of Bee Price MD      January 27, 2018 at 8:02 PM       Provider Attestation:      I personally performed the services described in the documentation, reviewed the documentation, as recorded by the scribe in my presence, and it accurately and completely records my words and actions.  January 27, 2018 at 8:02 PM - Bee Price MD    _______________________________

## 2018-01-28 NOTE — DISCHARGE INSTRUCTIONS
Exposure to Sexually Transmitted Infections: Care Instructions  Your Care Instructions    Sexually transmitted infections (STIs) are those diseases spread by sexual contact. There are at least 20 different STIs, including chlamydia, gonorrhea, syphilis, and human immunodeficiency virus (HIV), which causes AIDS. Bacteria-caused STIs can be treated and cured. STIs caused by viruses, such as HIV, can be treated but not cured. Some STIs can reduce a woman's chances of getting pregnant in the future. STIs are spread during sexual contact, such as vaginal intercourse and oral or anal sex. Follow-up care is a key part of your treatment and safety. Be sure to make and go to all appointments, and call your doctor if you are having problems. It's also a good idea to know your test results and keep a list of the medicines you take. How can you care for yourself at home? · Your doctor may have given you a shot of antibiotics. If your doctor prescribed antibiotic pills, take them as directed. Do not stop taking them just because you feel better. You need to take the full course of antibiotics. · Do not have sexual contact while you have symptoms of an STI or are being treated for an STI. · Tell your sex partner (or partners) that he or she will need treatment. · If you are a woman, do not douche. Douching changes the normal balance of bacteria in the vagina and may spread an infection up into your reproductive organs. To prevent exposure to STIs in the future  · Use latex condoms every time you have sex. Use them from the beginning to the end of sexual contact. · Talk to your partner before you have sex. Find out if he or she has or is at risk for any STI. Keep in mind that a person may be able to spread an STI even if he or she does not have symptoms. · Do not have sex if you are being treated for an STI. · Do not have sex with anyone who has symptoms of an STI, such as sores on the genitals or mouth.   · Having one sex partner (who does not have STIs and does not have sex with anyone else) is a good way to avoid STIs. When should you call for help? Call your doctor now or seek immediate medical care if:  ? · You have new pain in your belly or pelvis. ? · You have symptoms of a urinary tract infection. These may include:  ¨ Pain or burning when you urinate. ¨ A frequent need to urinate without being able to pass much urine. ¨ Pain in the flank, which is just below the rib cage and above the waist on either side of the back. ¨ Blood in your urine. ¨ A fever. ? · You have new or worsening pain or swelling in the scrotum. ? Watch closely for changes in your health, and be sure to contact your doctor if:  ? · You have unusual vaginal bleeding. ? · You have a discharge from the vagina or penis. ? · You have any new symptoms, such as sores, bumps, rashes, blisters, or warts. ? · You have itching, tingling, pain, or burning in the genital or anal area. ? · You think you may have an STI. Where can you learn more? Go to http://shima-espinoza.info/. Enter K224 in the search box to learn more about \"Exposure to Sexually Transmitted Infections: Care Instructions. \"  Current as of: March 20, 2017  Content Version: 11.4  © 8314-1018 [employer]. Care instructions adapted under license by [employer] (which disclaims liability or warranty for this information). If you have questions about a medical condition or this instruction, always ask your healthcare professional. [place] [address] any warranty or liability for your use of this information.

## 2018-01-29 LAB
C TRACH RRNA SPEC QL NAA+PROBE: POSITIVE
N GONORRHOEA RRNA SPEC QL NAA+PROBE: NEGATIVE
SPECIMEN SOURCE: ABNORMAL

## 2018-05-07 ENCOUNTER — HOSPITAL ENCOUNTER (EMERGENCY)
Age: 23
Discharge: STILL ADMITTED OR ADMINISTRATIVE | End: 2018-05-07
Attending: EMERGENCY MEDICINE
Payer: COMMERCIAL

## 2018-05-07 ENCOUNTER — HOSPITAL ENCOUNTER (OUTPATIENT)
Dept: GENERAL RADIOLOGY | Age: 23
Discharge: HOME | End: 2018-05-07
Payer: SELF-PAY

## 2018-05-07 DIAGNOSIS — M25.562 LEFT KNEE PAIN: ICD-10-CM

## 2018-05-07 PROCEDURE — 73562 X-RAY EXAM OF KNEE 3: CPT

## 2018-05-07 PROCEDURE — 75810000275 HC EMERGENCY DEPT VISIT NO LEVEL OF CARE

## 2018-07-08 ENCOUNTER — HOSPITAL ENCOUNTER (EMERGENCY)
Age: 23
Discharge: HOME | End: 2018-07-08
Attending: EMERGENCY MEDICINE
Payer: SELF-PAY

## 2018-07-08 VITALS
RESPIRATION RATE: 18 BRPM | HEART RATE: 77 BPM | OXYGEN SATURATION: 98 % | DIASTOLIC BLOOD PRESSURE: 96 MMHG | SYSTOLIC BLOOD PRESSURE: 147 MMHG | BODY MASS INDEX: 34.1 KG/M2 | HEIGHT: 68 IN | WEIGHT: 225 LBS | TEMPERATURE: 98.4 F

## 2018-07-08 DIAGNOSIS — L03.012 PARONYCHIA OF FINGER OF LEFT HAND: Primary | ICD-10-CM

## 2018-07-08 PROCEDURE — 99282 EMERGENCY DEPT VISIT SF MDM: CPT

## 2018-07-08 PROCEDURE — 75810000451 HC DRAIN ABSC FINGER SIMPLE

## 2018-07-09 NOTE — ED PROVIDER NOTES
EMERGENCY DEPARTMENT HISTORY AND PHYSICAL EXAM    9:41 PM      Date: 7/8/2018  Patient Name: Sherren Abide    History of Presenting Illness     Chief Complaint   Patient presents with    Finger Swelling         History Provided By: Patient    Chief Complaint: finger swelling  Duration: 4 Days  Timing:  Constant and Worsening  Location: left thumb  Quality: Aching  Severity: Moderate  Modifying Factors: none reported   Associated Symptoms: swelling       Additional History (Context): Sherren Abide is a 20 y.o. male with hypertension who presents with worsening left thumb pain with associated swelling that began 4 days ago. There has been no treatment prior to arrival. No other symptoms or concerns were expressed. PCP: Abundio Sharma MD        Past History     Past Medical History:  Past Medical History:   Diagnosis Date    Hypertension     no meds       Past Surgical History:  Past Surgical History:   Procedure Laterality Date    HX ORTHOPAEDIC Right     shoulder       Family History:  Family History   Problem Relation Age of Onset    Hypertension Mother     Hypertension Father     Hypertension Maternal Grandmother     Hypertension Maternal Grandfather     Hypertension Paternal Grandmother     Hypertension Paternal Grandfather        Social History:  Social History   Substance Use Topics    Smoking status: Never Smoker    Smokeless tobacco: Never Used    Alcohol use Yes      Comment: occasionally       Allergies:  No Known Allergies      Review of Systems       Review of Systems   Constitutional: Negative for activity change, fatigue and fever. HENT: Negative for congestion and rhinorrhea. Eyes: Negative for visual disturbance. Respiratory: Negative for shortness of breath. Cardiovascular: Negative for chest pain and palpitations. Gastrointestinal: Negative for abdominal pain, diarrhea, nausea and vomiting. Genitourinary: Negative for dysuria and hematuria.    Musculoskeletal: Positive for joint swelling (left thumb). Negative for back pain. Skin: Negative for rash. Neurological: Negative for dizziness, weakness and light-headedness. All other systems reviewed and are negative. Physical Exam     Visit Vitals    BP (!) 147/96 (BP 1 Location: Left arm, BP Patient Position: At rest)    Pulse 77    Temp 98.4 °F (36.9 °C)    Resp 18    Ht 5' 8\" (1.727 m)    Wt 102.1 kg (225 lb)    SpO2 98%    BMI 34.21 kg/m2         Physical Exam   Constitutional: He is oriented to person, place, and time. He appears well-developed and well-nourished. No distress. HENT:   Head: Normocephalic and atraumatic. Right Ear: External ear normal.   Left Ear: External ear normal.   Nose: Nose normal.   Mouth/Throat: Oropharynx is clear and moist.   Eyes: Conjunctivae and EOM are normal. Pupils are equal, round, and reactive to light. No scleral icterus. Neck: Normal range of motion. Neck supple. No JVD present. No tracheal deviation present. No thyromegaly present. Cardiovascular: Normal rate, regular rhythm, normal heart sounds and intact distal pulses. Exam reveals no gallop and no friction rub. No murmur heard. Pulmonary/Chest: Effort normal and breath sounds normal. He exhibits no tenderness. Abdominal: Soft. Bowel sounds are normal. He exhibits no distension. There is no tenderness. There is no rebound and no guarding. Musculoskeletal: Normal range of motion. He exhibits no edema or tenderness. Lymphadenopathy:     He has no cervical adenopathy. Neurological: He is alert and oriented to person, place, and time. No cranial nerve deficit. Coordination normal.   No sensory loss, Gait normal, Motor 5/5   Skin: Skin is warm and dry. Paronychia to left thumb   Psychiatric: He has a normal mood and affect. His behavior is normal. Judgment and thought content normal.   Nursing note and vitals reviewed.         Diagnostic Study Results     Labs -  No results found for this or any previous visit (from the past 12 hour(s)). Radiologic Studies -   No orders to display         Medical Decision Making   I am the first provider for this patient. I reviewed the vital signs, available nursing notes, past medical history, past surgical history, family history and social history. Vital Signs-Reviewed the patient's vital signs. Pulse Oximetry Analysis -  98% on room air (Interpretation)    Records Reviewed: Nursing Notes and Old Medical Records (Time of Review: 9:41 PM)    ED Course: Progress Notes, Reevaluation, and Consults:      Provider Notes (Medical Decision Making):     Procedures:   I&D Abcess Simple  Date/Time: 7/8/2018 9:45 PM  Performed by: Elina Salvador by: Junior Fofana     Consent:     Consent obtained:  Verbal    Consent given by:  Patient    Risks discussed:  Infection, incomplete drainage and pain  Universal protocol:     Procedure explained and questions answered to patient or proxy's satisfaction: yes      Patient identity confirmed:  Verbally with patient and arm band  Location:     Indications for incision and drainage: paronychia. Location:  Upper extremity    Upper extremity location:  Finger    Finger location:  L thumb  Procedure type:     Complexity:  Simple  Procedure details:     Scalpel blade:  11    Drainage:  Purulent (1 cc)  Post-procedure details:     Patient tolerance of procedure: Tolerated well, no immediate complications            Diagnosis     Clinical Impression: No diagnosis found.     Disposition:     Follow-up Information     None           Patient's Medications    No medications on file     _______________________________    Attestations:  Scribe Attestation     Dean Rowland acting as a scribe for and in the presence of Adelaida Franklin MD      July 08, 2018 at 9:41 PM       Provider Attestation:      I personally performed the services described in the documentation, reviewed the documentation, as recorded by the scribe in my presence, and it accurately and completely records my words and actions.  July 08, 2018 at 9:41 PM - Bryanna Beck MD    _______________________________

## 2018-07-09 NOTE — DISCHARGE INSTRUCTIONS
Paronychia: Care Instructions  Your Care Instructions  Paronychia (say \"jdbz-gh-YN-duc-uh\") is an infection of the skin around a fingernail or toenail. It happens when germs enter through a break in the skin. The doctor may have made a small cut in the infected area to drain the pus. Most cases of paronychia improve in a few days. But watch your symptoms and follow your doctor's advice. Though rare, a mild case can turn into something more serious and infect your entire finger or toe. Also, it is possible for an infection to return. Follow-up care is a key part of your treatment and safety. Be sure to make and go to all appointments, and call your doctor if you are having problems. It's also a good idea to know your test results and keep a list of the medicines you take. How can you care for yourself at home? · If your doctor told you how to care for your infected nail, follow the doctor's instructions. If you did not get instructions, follow this general advice:  ¨ Wash the area with clean water 2 times a day. Don't use hydrogen peroxide or alcohol, which can slow healing. ¨ You may cover the area with a thin layer of petroleum jelly, such as Vaseline, and a nonstick bandage. ¨ Apply more petroleum jelly and replace the bandage as needed. · If your doctor prescribed antibiotics, take them as directed. Do not stop taking them just because you feel better. You need to take the full course of antibiotics. · Take an over-the-counter pain medicine, such as acetaminophen (Tylenol), ibuprofen (Advil, Motrin), or naproxen (Aleve). Read and follow all instructions on the label. · Do not take two or more pain medicines at the same time unless the doctor told you to. Many pain medicines have acetaminophen, which is Tylenol. Too much acetaminophen (Tylenol) can be harmful. · Prop up the toe or finger so that it is higher than the level of your heart. This will help with pain and swelling. · Apply heat.  Put a warm water bottle, heating pad set on low, or warm cloth on your finger or toe. Do not go to sleep with a heating pad on your skin. · Soak the area in warm water twice a day for 15 minutes each time. After soaking, dry the area well and apply a thin layer of petroleum jelly, such as Vaseline. Put on a new bandage. When should you call for help? Call your doctor now or seek immediate medical care if:  ? · You have signs of new or worsening infection, such as:  ¨ Increased pain, swelling, warmth, or redness. ¨ Red streaks leading from the infected skin. ¨ Pus draining from the area. ¨ A fever. ? Watch closely for changes in your health, and be sure to contact your doctor if:  ? · You do not get better as expected. Where can you learn more? Go to http://shima-espinoza.info/. Enter C435 in the search box to learn more about \"Paronychia: Care Instructions. \"  Current as of: October 13, 2016  Content Version: 11.4  © 6433-3957 [employer]. Care instructions adapted under license by [employer] (which disclaims liability or warranty for this information). If you have questions about a medical condition or this instruction, always ask your healthcare professional. [address] any warranty or liability for your use of this information.

## 2018-07-09 NOTE — ED TRIAGE NOTES
Pt. Complains of finger swelling to the left thumb for the past 4 days that is getting more swollen.

## 2018-11-20 ENCOUNTER — OFFICE VISIT (OUTPATIENT)
Dept: ORTHOPEDIC SURGERY | Age: 23
End: 2018-11-20

## 2018-11-20 VITALS
SYSTOLIC BLOOD PRESSURE: 148 MMHG | TEMPERATURE: 98.1 F | OXYGEN SATURATION: 97 % | WEIGHT: 232 LBS | HEART RATE: 77 BPM | BODY MASS INDEX: 35.16 KG/M2 | DIASTOLIC BLOOD PRESSURE: 96 MMHG | RESPIRATION RATE: 16 BRPM | HEIGHT: 68 IN

## 2018-11-20 DIAGNOSIS — S43.101D ACROMIOCLAVICULAR JOINT SEPARATION, RIGHT, SUBSEQUENT ENCOUNTER: Primary | ICD-10-CM

## 2018-11-20 PROBLEM — E66.01 SEVERE OBESITY (HCC): Status: ACTIVE | Noted: 2018-11-20

## 2018-11-20 NOTE — PROGRESS NOTES
Misael Urias 1995 Chief Complaint Patient presents with  Follow-up Right shoulder follow up HISTORY OF PRESENT ILLNESS Ciara May is a 20 y.o. male who presents today for reevaluation of R shoulder. Patient rates pain as 0/10 today. Pt underwent a Right shoulder arthroscopic AC joint reconstruction on 4/19/17. Pt comes in today requesting clearance for work to perform all duties. He notes he has returned to work and exercising at normal levels. Patient denies any fever, chills, chest pain, shortness of breath or calf pain. The remainder of the review of systems is negative. There are no new illness or injuries to report since last seen in the office. There are no changes to medications, allergies, family or social history. PHYSICAL EXAM:  
Visit Vitals BP (!) 148/96 (BP 1 Location: Left arm, BP Patient Position: Sitting) Pulse 77 Temp 98.1 °F (36.7 °C) (Oral) Resp 16 Ht 5' 8\" (1.727 m) Wt 232 lb (105.2 kg) SpO2 97% BMI 35.28 kg/m² The patient is a well-developed, well-nourished male   in no acute distress. The patient is alert and oriented times three. The patient is alert and oriented times three. Mood and affect are normal. 
LYMPHATIC: lymph nodes are not enlarged and are within normal limits SKIN: normal in color and non tender to palpation. There are no bruises or abrasions noted. NEUROLOGICAL: Motor sensory exam is within normal limits. Reflexes are equal bilaterally. There is normal sensation to pinprick and light touch MUSCULOSKELETAL: 
Examination Right shoulder Skin Intact AC joint tenderness - Biceps tenderness - Forward flexion/Elevation  Active abduction  Glenohumeral abduction 90 External rotation ROM 90 Internal rotation ROM 70 Apprehension -  
Sterlings Relocation -  
Jerk - Load and Shift -  
Isabel Angeles - Speeds - Impingement sign - Supraspinatus/Empty Can -, 5/5  
 External Rotation Strength -, 5/5 Lift Off/Belly Press -, 5/5 Neurovascular Intact IMPRESSION:   
  ICD-10-CM ICD-9-CM 1. Acromioclavicular joint separation, right, subsequent encounter S43.101D V58.89   
  831.04 PLAN:  
1. Pt presents today requesting a clearance for full work activities. I filled out the form, he is cleared. Risk factors include: htn 2. No ultrasound exam indicated today 3. No cortisone injection indicated today 4. No Physical/Occupational Therapy indicated today 5. No diagnostic test indicated today: 6. No durable medical equipment indicated today 7. No referral indicated today 8. No medications indicated today: 9. No Narcotic indicated today RTC PRN Follow-up Disposition: Not on File Scribed by Maria Elena Vega ([address]) as dictated by Rosangela Gupta MD 
 
I, Dr. Rosangela Gupta, confirm that all documentation is accurate.  
 
Rosangela Gupta M.D.  
[facility]

## 2019-03-13 ENCOUNTER — HOSPITAL ENCOUNTER (EMERGENCY)
Age: 24
Discharge: HOME | End: 2019-03-13
Attending: EMERGENCY MEDICINE
Payer: MEDICAID

## 2019-03-13 VITALS
HEIGHT: 68 IN | BODY MASS INDEX: 35.61 KG/M2 | RESPIRATION RATE: 18 BRPM | OXYGEN SATURATION: 100 % | DIASTOLIC BLOOD PRESSURE: 101 MMHG | HEART RATE: 85 BPM | WEIGHT: 235 LBS | TEMPERATURE: 98.5 F | SYSTOLIC BLOOD PRESSURE: 149 MMHG

## 2019-03-13 DIAGNOSIS — S39.012A BACK STRAIN, INITIAL ENCOUNTER: Primary | ICD-10-CM

## 2019-03-13 LAB
APPEARANCE UR: CLEAR
BILIRUB UR QL: NEGATIVE
COLOR UR: YELLOW
GLUCOSE UR STRIP.AUTO-MCNC: NEGATIVE MG/DL
HGB UR QL STRIP: NEGATIVE
KETONES UR QL STRIP.AUTO: NEGATIVE MG/DL
LEUKOCYTE ESTERASE UR QL STRIP.AUTO: NEGATIVE
NITRITE UR QL STRIP.AUTO: NEGATIVE
PH UR STRIP: 5 [PH] (ref 5–8)
PROT UR STRIP-MCNC: NEGATIVE MG/DL
SP GR UR REFRACTOMETRY: 1.02 (ref 1–1.03)
UROBILINOGEN UR QL STRIP.AUTO: 0.2 EU/DL (ref 0.2–1)

## 2019-03-13 PROCEDURE — 99282 EMERGENCY DEPT VISIT SF MDM: CPT

## 2019-03-13 PROCEDURE — 81003 URINALYSIS AUTO W/O SCOPE: CPT

## 2019-03-13 NOTE — ED NOTES
Guillermo Lombard is a 20 y.o. male that was discharged in stable condition. The patients diagnosis, condition and treatment were explained to  patient and aftercare instructions were given. The patient verbalized understanding. Patient armband removed and shredded.

## 2019-03-13 NOTE — ED TRIAGE NOTES
Patient states he had a routine physical done for a new job. They told him he had blood in his urine and asked about his symptoms. He states he has had right sided low back pain and groin pain for about 2 months. He states he has not had any urinary symptoms. He states he had a couple days of vomiting last week.

## 2019-03-13 NOTE — DISCHARGE INSTRUCTIONS

## 2020-11-30 ENCOUNTER — TRANSCRIBE ORDER (OUTPATIENT)
Dept: SCHEDULING | Age: 25
End: 2020-11-30

## 2020-11-30 DIAGNOSIS — R22.1 NECK MASS: Primary | ICD-10-CM

## 2020-12-14 ENCOUNTER — HOSPITAL ENCOUNTER (OUTPATIENT)
Dept: ULTRASOUND IMAGING | Age: 25
Discharge: HOME | End: 2020-12-14
Attending: FAMILY MEDICINE
Payer: COMMERCIAL

## 2020-12-14 DIAGNOSIS — R22.1 NECK MASS: ICD-10-CM

## 2020-12-14 PROCEDURE — 76536 US EXAM OF HEAD AND NECK: CPT

## 2022-03-19 PROBLEM — E66.01 SEVERE OBESITY (HCC): Status: ACTIVE | Noted: 2018-11-20

## 2022-04-25 ENCOUNTER — HOSPITAL ENCOUNTER (EMERGENCY)
Age: 27
Discharge: HOME | End: 2022-04-25
Attending: EMERGENCY MEDICINE
Payer: COMMERCIAL

## 2022-04-25 ENCOUNTER — APPOINTMENT (OUTPATIENT)
Dept: GENERAL RADIOLOGY | Age: 27
End: 2022-04-25
Attending: EMERGENCY MEDICINE
Payer: COMMERCIAL

## 2022-04-25 VITALS
HEIGHT: 68 IN | BODY MASS INDEX: 33.34 KG/M2 | OXYGEN SATURATION: 100 % | TEMPERATURE: 98.3 F | HEART RATE: 57 BPM | WEIGHT: 220 LBS | SYSTOLIC BLOOD PRESSURE: 134 MMHG | DIASTOLIC BLOOD PRESSURE: 88 MMHG | RESPIRATION RATE: 18 BRPM

## 2022-04-25 DIAGNOSIS — R07.9 CHEST PAIN, UNSPECIFIED TYPE: Primary | ICD-10-CM

## 2022-04-25 LAB
ANION GAP SERPL CALC-SCNC: 5 MMOL/L (ref 3–18)
APPEARANCE UR: CLEAR
BACTERIA URNS QL MICRO: NEGATIVE /HPF
BASOPHILS # BLD: 0 K/UL (ref 0–0.1)
BASOPHILS NFR BLD: 0 % (ref 0–2)
BILIRUB UR QL: NEGATIVE
BUN SERPL-MCNC: 13 MG/DL (ref 7–18)
BUN/CREAT SERPL: 11 (ref 12–20)
CALCIUM SERPL-MCNC: 9.2 MG/DL (ref 8.5–10.1)
CHLORIDE SERPL-SCNC: 105 MMOL/L (ref 100–111)
CO2 SERPL-SCNC: 28 MMOL/L (ref 21–32)
COLOR UR: YELLOW
CREAT SERPL-MCNC: 1.2 MG/DL (ref 0.6–1.3)
DIFFERENTIAL METHOD BLD: NORMAL
EOSINOPHIL # BLD: 0 K/UL (ref 0–0.4)
EOSINOPHIL NFR BLD: 1 % (ref 0–5)
EPITH CASTS URNS QL MICRO: NORMAL /LPF (ref 0–5)
ERYTHROCYTE [DISTWIDTH] IN BLOOD BY AUTOMATED COUNT: 12.7 % (ref 11.6–14.5)
GLUCOSE SERPL-MCNC: 76 MG/DL (ref 74–99)
GLUCOSE UR STRIP.AUTO-MCNC: NEGATIVE MG/DL
HCT VFR BLD AUTO: 46.1 % (ref 36–48)
HGB BLD-MCNC: 14.6 G/DL (ref 13–16)
HGB UR QL STRIP: NEGATIVE
IMM GRANULOCYTES # BLD AUTO: 0 K/UL (ref 0–0.04)
IMM GRANULOCYTES NFR BLD AUTO: 0 % (ref 0–0.5)
KETONES UR QL STRIP.AUTO: NEGATIVE MG/DL
LEUKOCYTE ESTERASE UR QL STRIP.AUTO: ABNORMAL
LYMPHOCYTES # BLD: 2.2 K/UL (ref 0.9–3.6)
LYMPHOCYTES NFR BLD: 29 % (ref 21–52)
MCH RBC QN AUTO: 28.5 PG (ref 24–34)
MCHC RBC AUTO-ENTMCNC: 31.7 G/DL (ref 31–37)
MCV RBC AUTO: 89.9 FL (ref 78–100)
MONOCYTES # BLD: 0.8 K/UL (ref 0.05–1.2)
MONOCYTES NFR BLD: 10 % (ref 3–10)
NEUTS SEG # BLD: 4.6 K/UL (ref 1.8–8)
NEUTS SEG NFR BLD: 60 % (ref 40–73)
NITRITE UR QL STRIP.AUTO: NEGATIVE
NRBC # BLD: 0 K/UL (ref 0–0.01)
NRBC BLD-RTO: 0 PER 100 WBC
PH UR STRIP: 6.5 [PH] (ref 5–8)
PLATELET # BLD AUTO: 256 K/UL (ref 135–420)
PMV BLD AUTO: 10.5 FL (ref 9.2–11.8)
POTASSIUM SERPL-SCNC: 5.7 MMOL/L (ref 3.5–5.5)
PROT UR STRIP-MCNC: 30 MG/DL
RBC # BLD AUTO: 5.13 M/UL (ref 4.35–5.65)
RBC #/AREA URNS HPF: NEGATIVE /HPF (ref 0–5)
SODIUM SERPL-SCNC: 138 MMOL/L (ref 136–145)
SP GR UR REFRACTOMETRY: 1.02 (ref 1–1.03)
TROPONIN-HIGH SENSITIVITY: 4 NG/L (ref 0–78)
UROBILINOGEN UR QL STRIP.AUTO: 1 EU/DL (ref 0.2–1)
WBC # BLD AUTO: 7.7 K/UL (ref 4.6–13.2)
WBC URNS QL MICRO: NORMAL /HPF (ref 0–4)

## 2022-04-25 PROCEDURE — 71045 X-RAY EXAM CHEST 1 VIEW: CPT

## 2022-04-25 PROCEDURE — 99285 EMERGENCY DEPT VISIT HI MDM: CPT

## 2022-04-25 PROCEDURE — 85025 COMPLETE CBC W/AUTO DIFF WBC: CPT

## 2022-04-25 PROCEDURE — 81001 URINALYSIS AUTO W/SCOPE: CPT

## 2022-04-25 PROCEDURE — 80048 BASIC METABOLIC PNL TOTAL CA: CPT

## 2022-04-25 PROCEDURE — 93005 ELECTROCARDIOGRAM TRACING: CPT

## 2022-04-25 PROCEDURE — 84484 ASSAY OF TROPONIN QUANT: CPT

## 2022-04-25 RX ORDER — AMLODIPINE BESYLATE 10 MG/1
10 TABLET ORAL DAILY
COMMUNITY

## 2022-04-25 NOTE — ED PROVIDER NOTES
EMERGENCY DEPARTMENT HISTORY AND PHYSICAL EXAM  This was created with voice recognition software and transcription errors may be present. 12:12 PM  Date: 4/25/2022  Patient Name: Anderson Live    History of Presenting Illness     Chief Complaint:    History Provided By:     HPI: Anderson Live is a 32 y.o. male past medical history of hypertension who presents with chest pain. Patient notes chest pain on left side radiates to his back? 20 minutes at a time not related to exertion. No relation to exercise. No nausea no vomiting no diaphoresis no fevers no chills no shortness of breath. Patient presents today because he has been unknown deployment for a few months he states its been going on he normally just rest when it happens but its not predictable in its recurrence. PCP: Kristyn Yu MD      Past History     Past Medical History:  Past Medical History:   Diagnosis Date    Hypertension     no meds       Past Surgical History:  Past Surgical History:   Procedure Laterality Date    HX ORTHOPAEDIC Right     shoulder       Family History:  Family History   Problem Relation Age of Onset    Hypertension Mother     Hypertension Father     Hypertension Maternal Grandmother     Hypertension Maternal Grandfather     Hypertension Paternal Grandmother     Hypertension Paternal Grandfather        Social History:  Social History     Tobacco Use    Smoking status: Never Smoker    Smokeless tobacco: Never Used   Substance Use Topics    Alcohol use: Yes     Comment: occ    Drug use: No       Allergies:  No Known Allergies    Review of Systems     Review of Systems   Constitutional: Negative for activity change. HENT: Negative for congestion. All other systems reviewed and are negative. 10 point review of systems otherwise negative unless noted in HPI. Physical Exam       Physical Exam  Constitutional:       Appearance: He is well-developed.    HENT:      Head: Normocephalic and atraumatic. Eyes:      Pupils: Pupils are equal, round, and reactive to light. Cardiovascular:      Rate and Rhythm: Normal rate and regular rhythm. Heart sounds: Normal heart sounds. No murmur heard. No friction rub. Pulmonary:      Effort: Pulmonary effort is normal. No respiratory distress. Breath sounds: Normal breath sounds. No wheezing. Abdominal:      General: There is no distension. Palpations: Abdomen is soft. Tenderness: There is no abdominal tenderness. There is no guarding or rebound. Musculoskeletal:         General: Normal range of motion. Cervical back: Normal range of motion and neck supple. Skin:     General: Skin is warm and dry. Neurological:      Mental Status: He is alert and oriented to person, place, and time. Psychiatric:         Behavior: Behavior normal.         Thought Content: Thought content normal.         Diagnostic Study Results     Vital Signs  EKG: EKG shows sinus bradycardia 7 normal axis normal intervals no ST elevation or depression no hypertrophy T wave version inferiorly normal #3  Labs: Is unremarkable urine is negative troponin is negative calcium is slightly elevated but hemolyzed  Imaging: X-rays negative  Medical Decision Making     ED Course: Progress Notes, Reevaluation, and Consults:    I will be the provider of record for this patient. Provider Notes (Medical Decision Making): Patient with atypical chest pain. Nonexertional no nausea no vomiting no diaphoresis only risk factor is hypertension. No family history of  coronary disease non-smoker nondiabetic. Blood work unremarkable labs show mildly high potassium with a creatinine of 1.2 however is hemolyzed will defer for outpatient follow-up    Pain 2 weeks ago reviewed the results with the patient has follow-up in 2 days with PFM I think that is fine         Diagnosis     Clinical Impression: No diagnosis found.     Disposition:        Patient's Medications   Start Taking    No medications on file   Continue Taking    AMLODIPINE (NORVASC) 10 MG TABLET    Take 10 mg by mouth daily.    These Medications have changed    No medications on file   Stop Taking    No medications on file

## 2022-04-25 NOTE — ED TRIAGE NOTES
Patient c/o left chest pain and lower back pain that began this morning. He denies injury, heavy lifting or falls.

## 2022-04-25 NOTE — ED TRIAGE NOTES
Patient reports that he has been prescribed amlodipine 10 mg to treat HTN. He states that he ran out of his blood pressure medication approximately one month ago.

## 2022-04-26 LAB
ATRIAL RATE: 57 BPM
CALCULATED P AXIS, ECG09: 64 DEGREES
CALCULATED R AXIS, ECG10: 57 DEGREES
CALCULATED T AXIS, ECG11: 7 DEGREES
DIAGNOSIS, 93000: NORMAL
P-R INTERVAL, ECG05: 170 MS
Q-T INTERVAL, ECG07: 410 MS
QRS DURATION, ECG06: 88 MS
QTC CALCULATION (BEZET), ECG08: 399 MS
VENTRICULAR RATE, ECG03: 57 BPM

## 2023-02-14 NOTE — PROGRESS NOTES
Disability Forms received from [employer] 574 4-11-17, need to be completed Bi-Rhombic Flap Text: The defect edges were debeveled with a #15 scalpel blade.  Given the location of the defect and the proximity to free margins a bi-rhombic flap was deemed most appropriate.  Using a sterile surgical marker, an appropriate rhombic flap was drawn incorporating the defect. The area thus outlined was incised deep to adipose tissue with a #15 scalpel blade.  The skin margins were undermined to an appropriate distance in all directions utilizing iris scissors.

## 2023-08-02 NOTE — ED PROVIDER NOTES
EMERGENCY DEPARTMENT HISTORY AND PHYSICAL EXAM    10:51 AM      Date: 3/13/2019  Patient Name: Franco Mcclelland    History of Presenting Illness     Chief Complaint   Patient presents with    Groin Pain    Flank Pain    Blood in Urine       History Provided By: Patient      Additional History (Context): Franco Mcclelland is a 20 y.o. male with No significant past medical history who presents with acute and worsening intermittent mid-back pain over onset 2 months ago and worsened over the last week. Patient confirms pain is worse with movements and laying on right side; he denies any specific urinary sx. He notes he had a physical exam for work on 3/4 and urine sample showed blood in urine; wants to get checked for UTI or kidney infection/stone. He has taken Ibuprofen for the pain with minimal relief. No other concerns or symptoms at this time. PCP: Marilee Javed MD      Past History     Past Medical History:  Past Medical History:   Diagnosis Date    Hypertension     no meds       Past Surgical History:  Past Surgical History:   Procedure Laterality Date    HX ORTHOPAEDIC Right     shoulder       Family History:  Family History   Problem Relation Age of Onset    Hypertension Mother     Hypertension Father     Hypertension Maternal Grandmother     Hypertension Maternal Grandfather     Hypertension Paternal Grandmother     Hypertension Paternal Grandfather        Social History:  Social History     Tobacco Use    Smoking status: Never Smoker    Smokeless tobacco: Never Used   Substance Use Topics    Alcohol use: Yes     Comment: occasionally    Drug use: No       Allergies:  No Known Allergies      Review of Systems       Review of Systems   Constitutional: Negative for activity change, fatigue and fever. HENT: Negative for congestion and rhinorrhea. Eyes: Negative for visual disturbance. Respiratory: Negative for shortness of breath.     Cardiovascular: Negative for chest pain and Pt ambulatory to triage with spouse. Pt reports productive cough with green to yellow mucus and sore throat x1 week. Pt also reports dizziness that started today while driving. Pt denies history of vertigo. Pt c/o slight headache for 4-5 days. Pt reports right eye drainage x2 days. palpitations. Gastrointestinal: Negative for abdominal pain, diarrhea, nausea and vomiting. Genitourinary: Negative for dysuria and hematuria. Musculoskeletal: Positive for back pain. Skin: Negative for rash. Neurological: Negative for dizziness, weakness and light-headedness. Psychiatric/Behavioral: Negative for agitation. All other systems reviewed and are negative. Physical Exam     Visit Vitals  BP (!) 149/101 (BP 1 Location: Left arm, BP Patient Position: At rest;Sitting)   Pulse 85   Temp 98.5 °F (36.9 °C)   Resp 18   Ht 5' 8\" (1.727 m)   Wt 106.6 kg (235 lb)   SpO2 100%   BMI 35.73 kg/m²         Physical Exam   Constitutional: He is oriented to person, place, and time. He appears well-developed and well-nourished. HENT:   Head: Normocephalic and atraumatic. Mouth/Throat: Oropharynx is clear and moist.   Eyes: Conjunctivae are normal.   Neck: Normal range of motion. Neck supple. No JVD present. Cardiovascular: Normal rate, regular rhythm, normal heart sounds and intact distal pulses. No murmur heard. Pulmonary/Chest: Effort normal and breath sounds normal.   Abdominal: Soft. Bowel sounds are normal. He exhibits no distension. There is no tenderness. Musculoskeletal: Normal range of motion. He exhibits no deformity. Lymphadenopathy:     He has no cervical adenopathy. Neurological: He is alert and oriented to person, place, and time. Coordination normal.   Skin: Skin is warm and dry. No rash noted. Psychiatric: He has a normal mood and affect. Nursing note and vitals reviewed. Diagnostic Study Results     Labs -  No results found for this or any previous visit (from the past 12 hour(s)). Radiologic Studies -   No orders to display         Medical Decision Making     It should be noted that I, Brigido Méndez MD will be the provider of record for this patient.     I reviewed the vital signs, available nursing notes, past medical history, past surgical history, family history and social history. Vital Signs-Reviewed the patient's vital signs. Pulse Oximetry Analysis -  100% on room air (Interpretation WNL)    Cardiac Monitor:  Rate: 85 BPM    Records Reviewed: Nursing Notes (Time of Review: 10:51 AM)    ED Course: Progress Notes, Reevaluation, and Consults:      Provider Notes (Medical Decision Making): Results reviewed with pt, he will F/U with his PCP next week as previously scheduled. Keagan Gordon MD  11:53 AM        Diagnosis     Clinical Impression: No diagnosis found. Disposition: Home    Follow-up Information    None             Medication List      You have not been prescribed any medications. _______________________________       Qi Levine acting as a scribe for and in the presence of Judy Silverio MD      March 13, 2019 at 10:51 AM       Provider Attestation:      I personally performed the services described in the documentation, reviewed the documentation, as recorded by the scribe in my presence, and it accurately and completely records my words and actions.  March 13, 2019 at 10:51 AM - Judy Silverio MD      _______________________________

## 2023-12-15 NOTE — LETTER
NOTIFICATION RETURN TO WORK / SCHOOL 
 
6/2/2017 1:37 PM 
 
Mr. Misael Urias [address] [place] [id number] To Whom It May Concern: 
 
Kassy Mclaughlin is currently under the care of [address] [place] [address] Tyo [place]adarrama. Please allow him to remain out of work for the next 3 weeks until his follow-up appointment due to not being able to accommodate him with a light-duty status. If there are questions or concerns please have the patient contact our office. Sincerely, Stephanie Jacome MD 
 
                                
 
 Self

## 2024-07-12 ENCOUNTER — HOSPITAL ENCOUNTER (EMERGENCY)
Facility: HOSPITAL | Age: 29
Discharge: HOME | End: 2024-07-12
Attending: STUDENT IN AN ORGANIZED HEALTH CARE EDUCATION/TRAINING PROGRAM
Payer: COMMERCIAL

## 2024-07-12 VITALS
HEIGHT: 69 IN | BODY MASS INDEX: 37.03 KG/M2 | TEMPERATURE: 98.2 F | WEIGHT: 250 LBS | DIASTOLIC BLOOD PRESSURE: 89 MMHG | SYSTOLIC BLOOD PRESSURE: 132 MMHG | RESPIRATION RATE: 18 BRPM | HEART RATE: 61 BPM | OXYGEN SATURATION: 97 %

## 2024-07-12 DIAGNOSIS — G43.909 MIGRAINE WITHOUT STATUS MIGRAINOSUS, NOT INTRACTABLE, UNSPECIFIED MIGRAINE TYPE: Primary | ICD-10-CM

## 2024-07-12 PROCEDURE — 6370000000 HC RX 637 (ALT 250 FOR IP): Performed by: STUDENT IN AN ORGANIZED HEALTH CARE EDUCATION/TRAINING PROGRAM

## 2024-07-12 PROCEDURE — 6360000002 HC RX W HCPCS: Performed by: STUDENT IN AN ORGANIZED HEALTH CARE EDUCATION/TRAINING PROGRAM

## 2024-07-12 PROCEDURE — 99284 EMERGENCY DEPT VISIT MOD MDM: CPT

## 2024-07-12 PROCEDURE — 96372 THER/PROPH/DIAG INJ SC/IM: CPT

## 2024-07-12 RX ORDER — BUTALBITAL, ACETAMINOPHEN AND CAFFEINE 300; 40; 50 MG/1; MG/1; MG/1
1 CAPSULE ORAL
Status: COMPLETED | OUTPATIENT
Start: 2024-07-12 | End: 2024-07-12

## 2024-07-12 RX ORDER — BUTALBITAL, ACETAMINOPHEN AND CAFFEINE 300; 40; 50 MG/1; MG/1; MG/1
1 CAPSULE ORAL EVERY 4 HOURS PRN
Qty: 84 CAPSULE | Refills: 0 | Status: SHIPPED | OUTPATIENT
Start: 2024-07-12

## 2024-07-12 RX ORDER — KETOROLAC TROMETHAMINE 15 MG/ML
30 INJECTION, SOLUTION INTRAMUSCULAR; INTRAVENOUS ONCE
Status: COMPLETED | OUTPATIENT
Start: 2024-07-12 | End: 2024-07-12

## 2024-07-12 RX ADMIN — BUTALBITA,ACETAMINOPHEN AND CAFFEINE 1 CAPSULE: 50; 300; 40 CAPSULE ORAL at 17:25

## 2024-07-12 RX ADMIN — KETOROLAC TROMETHAMINE 30 MG: 15 INJECTION, SOLUTION INTRAMUSCULAR; INTRAVENOUS at 17:21

## 2024-07-12 ASSESSMENT — PAIN DESCRIPTION - LOCATION
LOCATION: HEAD

## 2024-07-12 ASSESSMENT — PAIN SCALES - GENERAL
PAINLEVEL_OUTOF10: 5
PAINLEVEL_OUTOF10: 2
PAINLEVEL_OUTOF10: 7

## 2024-07-12 ASSESSMENT — PAIN - FUNCTIONAL ASSESSMENT
PAIN_FUNCTIONAL_ASSESSMENT: ACTIVITIES ARE NOT PREVENTED
PAIN_FUNCTIONAL_ASSESSMENT: ACTIVITIES ARE NOT PREVENTED
PAIN_FUNCTIONAL_ASSESSMENT: 0-10

## 2024-07-12 ASSESSMENT — PAIN DESCRIPTION - ORIENTATION
ORIENTATION: RIGHT;LEFT;ANTERIOR
ORIENTATION: RIGHT;LEFT;ANTERIOR

## 2024-07-12 ASSESSMENT — PAIN DESCRIPTION - DESCRIPTORS
DESCRIPTORS: TIGHTNESS;SQUEEZING
DESCRIPTORS: ACHING

## 2024-07-12 NOTE — ED PROVIDER NOTES
EMERGENCY DEPARTMENT HISTORY AND PHYSICAL EXAM      Date: 7/12/2024  Patient Name: Ender Peralta    History of Presenting Illness     Chief Complaint   Patient presents with    Headache       History (Context): Ender Peralta is a 29 y.o. male  presents to the ED today with chief complaint of headache.  Patient states headache has been ongoing for the past 4 days and has not worsened or improved since onset.  States that he has a history of headaches however this 1 is different.  Denies any phonophobia or photophobia.  Denies numbness, tingling, or unilateral weakness.  States that he has been taking ibuprofen and Sudafed for treatment without significant treatment.      PCP: Fracisco Shen MD    No current facility-administered medications for this encounter.     Current Outpatient Medications   Medication Sig Dispense Refill    butalbital-APAP-caffeine -40 MG CAPS per capsule Take 1 capsule by mouth every 4 hours as needed for Headaches 84 capsule 0    amLODIPine (NORVASC) 10 MG tablet Take 10 mg by mouth daily         Past History     Past Medical History:   Past Medical History:   Diagnosis Date    Hypertension     no meds       Past Surgical History:  Past Surgical History:   Procedure Laterality Date    ORTHOPEDIC SURGERY Right     shoulder       Family History:  Family History   Problem Relation Age of Onset    Hypertension Father     Hypertension Mother     Hypertension Maternal Grandmother     Hypertension Maternal Grandfather     Hypertension Paternal Grandmother     Hypertension Paternal Grandfather        Social History:   Social History     Tobacco Use    Smoking status: Never    Smokeless tobacco: Never   Substance Use Topics    Alcohol use: Yes    Drug use: No       Allergies:  No Known Allergies      Physical Exam     Vitals:    07/12/24 1632   BP: (!) 162/95   Pulse: 71   Resp: 18   Temp: 98.2 °F (36.8 °C)   TempSrc: Oral   SpO2: 97%   Weight: 113.4 kg (250 lb)   Height: 1.753 m  [Follow-Up - Clinic] : a clinic follow-up of [Atrial Fibrillation] : atrial fibrillation [Hypertension] : hypertension

## (undated) DEVICE — SLIM BODY SKIN STAPLER: Brand: APPOSE ULC

## (undated) DEVICE — SPONGE HEMOSTAT CELLULS 4X8IN -- SURGICEL

## (undated) DEVICE — SUTURE VCRL SZ 2-0 L27IN ABSRB VLT L36MM CT-1 1/2 CIR J339H

## (undated) DEVICE — (D)GLOVE SURG ORTH 7.5 PWD LTX -- DISC BY MFR USE ITEM 278014

## (undated) DEVICE — (D)STRIP SKN CLSR 0.5X4IN WHT --

## (undated) DEVICE — SUTURE VCRL SZ 0 L27IN ABSRB UD L26MM CT-2 1/2 CIR J270H

## (undated) DEVICE — BOWL BNE CEM MIX AND SPAT DISP

## (undated) DEVICE — KERLIX BANDAGE ROLL: Brand: KERLIX

## (undated) DEVICE — ROTATOR CUFF REPAIR INSTRUMENTS SUTURE RETRIEVER, LARGE: Brand: CONMED

## (undated) DEVICE — Device

## (undated) DEVICE — 3M™ TEGADERM™ TRANSPARENT FILM DRESSING FRAME STYLE, 1626W, 4 IN X 4-3/4 IN (10 CM X 12 CM), 50/CT 4CT/CASE: Brand: 3M™ TEGADERM™

## (undated) DEVICE — BLADE SHV CUT MENIS AGG + 4MM --

## (undated) DEVICE — NDL SPNE QNCKE 18GX3.5IN LF --

## (undated) DEVICE — SUTURE NONABSORBABLE L60IN L65MM SZ 1-0 BLK 1 1/2 CIR NYL 824G

## (undated) DEVICE — SUTURE VCRL SZ 0 L27IN ABSRB UD L36MM CT-1 1/2 CIR J260H

## (undated) DEVICE — SOLUTION IV 1000ML 0.9% SOD CHL

## (undated) DEVICE — PREP SKN CHLRAPRP APPL 10.5ML --

## (undated) DEVICE — Z CONVERTED USE 2273640 DEPRESSOR TNGE SMOOTH 0.69X6 IN WHT BIRCH BLADE NS PURITAN

## (undated) DEVICE — 90-S MAX, SUCTION PROBE, NON-BENDABLE, MAX CUT LEVEL 11: Brand: SERFAS ENERGY

## (undated) DEVICE — SUTURE MCRYL SZ 4-0 L27IN ABSRB UD L24MM PS-1 3/8 CIR PRIM Y935H

## (undated) DEVICE — BLADE ASSEMB CLP HAIR FINE --

## (undated) DEVICE — 2108 SERIES SAGITTAL BLADE (24.8 X 1.24 X 80.1MM)

## (undated) DEVICE — SUTURE VCRL SZ 2-0 L27IN ABSRB VLT L26MM CT-2 1/2 CIR J333H

## (undated) DEVICE — ROTATOR CUFF REPAIR INSTRUMENTS SUTURE RETRIEVER, MEDIUM: Brand: CONMED

## (undated) DEVICE — KIT CLN UP BON SECOURS MARYV

## (undated) DEVICE — ABDOMINAL PAD: Brand: DERMACEA

## (undated) DEVICE — NEEDLE ELECTRODE: Brand: EDGE

## (undated) DEVICE — 3M™ BAIR PAWS FLEX™ WARMING GOWN, STANDARD, 20 PER CASE 81003: Brand: BAIR PAWS™

## (undated) DEVICE — SOLUTION IRRIG 3000ML 0.9% SOD CHL FLX CONT 0797208] ICU MEDICAL INC]

## (undated) DEVICE — SHEET,DRAPE,70X100,STERILE: Brand: MEDLINE

## (undated) DEVICE — LIGHT HANDLE: Brand: DEVON

## (undated) DEVICE — [ARTHROSCOPY PUMP,  DO NOT USE IF PACKAGE IS DAMAGED,  KEEP DRY,  KEEP AWAY FROM SUNLIGHT,  PROTECT FROM HEAT AND RADIOACTIVE SOURCES.]: Brand: FLOSTEADY

## (undated) DEVICE — BIT DRL DIA3MM CLAV CANN DOG BNE

## (undated) DEVICE — PACK PROCEDURE SURG TOT HIP BSHR LF

## (undated) DEVICE — DRAPE,U/SHT,SPLIT,FILM,60X84,STERILE: Brand: MEDLINE

## (undated) DEVICE — 3M™ IOBAN™ 2 ANTIMICROBIAL INCISE DRAPE 6651EZ: Brand: IOBAN™ 2

## (undated) DEVICE — SUT FIBERTAPE 2MM 54IN 2 8IN --

## (undated) DEVICE — FLEX ADVANTAGE 3000CC: Brand: FLEX ADVANTAGE

## (undated) DEVICE — 4.0MM EGG BUR

## (undated) DEVICE — NEEDLE HYPO 18GA L1.5IN PNK S STL HUB POLYPR SHLD REG BVL

## (undated) DEVICE — HANDPIECE SET WITH HIGH FLOW TIP AND SUCTION TUBE: Brand: INTERPULSE

## (undated) DEVICE — DRAIN SURG L0.75IN TRCR

## (undated) DEVICE — INTENDED FOR TISSUE SEPARATION, AND OTHER PROCEDURES THAT REQUIRE A SHARP SURGICAL BLADE TO PUNCTURE OR CUT.: Brand: BARD-PARKER SAFETY BLADES SIZE 10, STERILE

## (undated) DEVICE — HEX-LOCKING BLADE ELECTRODE: Brand: EDGE

## (undated) DEVICE — KENDALL SCD EXPRESS SLEEVES, KNEE LENGTH, MEDIUM: Brand: KENDALL SCD

## (undated) DEVICE — SYR IRR CATH TIP LR ADPT 70ML -- CONVERT TO ITEM 363120

## (undated) DEVICE — MEDI-VAC NON-CONDUCTIVE SUCTION TUBING: Brand: CARDINAL HEALTH

## (undated) DEVICE — SPONGE DISSECT PNUT SM 3/8IN -- 5/PK

## (undated) DEVICE — INTENDED FOR TISSUE SEPARATION, AND OTHER PROCEDURES THAT REQUIRE A SHARP SURGICAL BLADE TO PUNCTURE OR CUT.: Brand: BARD-PARKER ® CARBON RIB-BACK BLADES

## (undated) DEVICE — SKIN MARKER,REGULAR TIP WITH RULER AND LABELS: Brand: DEVON

## (undated) DEVICE — SUTURE FIBERWIRE SZ 2 W/ TAPERED NEEDLE BLUE L38IN NONABSORB BLU L26.5MM 1/2 CIRCLE AR7200

## (undated) DEVICE — SUTURE MCRYL SZ 4-0 L18IN ABSRB UD L19MM PS-2 3/8 CIR PRIM Y496G

## (undated) DEVICE — GAUZE SPONGES,16 PLY: Brand: CURITY

## (undated) DEVICE — NEEDLE NRV BLK 22GA L2IN 30DEG INSUL BVL EXTN SET STIMUPLEX

## (undated) DEVICE — 3M™ MICROFOAM™ TAPE 1528-4: Brand: 3M™ MICROFOAM™

## (undated) DEVICE — BOWL AND CEMENT CARTRIDGE WITH BREAKAWAY FEMORAL NOZZLE: Brand: ACM

## (undated) DEVICE — HEWSON SUTURE RETRIEVER: Brand: HEWSON SUTURE RETRIEVER

## (undated) DEVICE — SUT ETHBND 2 30IN OS4 GRN --

## (undated) DEVICE — BUR RMFG SHV HLLW 6 FLUT 5.5MM --

## (undated) DEVICE — SPONGE LAP 18X18IN STRL -- 5/PK

## (undated) DEVICE — DISPOSABLE MULTI BAG ADAPTERS Y                                    TUBING, STERILE, 2 TO A SET 6 SETS                                    PER BOX

## (undated) DEVICE — CANNULA ARTHSCP L7CM ID8.25MM TRNSLUC THRD FLX W/ NO SQUIRT

## (undated) DEVICE — IMMOBILIZER SHLDR L L18IN D9IN UNIV POLY COT W/ FOAM STRP